# Patient Record
Sex: MALE | Race: WHITE | NOT HISPANIC OR LATINO | Employment: FULL TIME | URBAN - METROPOLITAN AREA
[De-identification: names, ages, dates, MRNs, and addresses within clinical notes are randomized per-mention and may not be internally consistent; named-entity substitution may affect disease eponyms.]

---

## 2018-09-29 ENCOUNTER — OFFICE VISIT (OUTPATIENT)
Dept: URGENT CARE | Facility: CLINIC | Age: 28
End: 2018-09-29
Payer: COMMERCIAL

## 2018-09-29 VITALS
HEART RATE: 78 BPM | TEMPERATURE: 98.6 F | OXYGEN SATURATION: 96 % | WEIGHT: 240 LBS | SYSTOLIC BLOOD PRESSURE: 171 MMHG | BODY MASS INDEX: 33.6 KG/M2 | DIASTOLIC BLOOD PRESSURE: 102 MMHG | RESPIRATION RATE: 18 BRPM | HEIGHT: 71 IN

## 2018-09-29 DIAGNOSIS — R03.0 ELEVATED BLOOD PRESSURE READING IN OFFICE WITHOUT DIAGNOSIS OF HYPERTENSION: ICD-10-CM

## 2018-09-29 DIAGNOSIS — R10.32 LEFT LOWER QUADRANT PAIN: Primary | ICD-10-CM

## 2018-09-29 PROCEDURE — 99203 OFFICE O/P NEW LOW 30 MIN: CPT | Performed by: EMERGENCY MEDICINE

## 2018-09-29 NOTE — PROGRESS NOTES
[address] [facility] [facility]        NAME: Kezia Rosado is a 29 y o  male  : 1990    MRN: [id number]  DATE: 2018  TIME: 9:23 AM    Assessment and Plan   Left lower quadrant pain [R10 32]  1  Left lower quadrant pain     2  Elevated blood pressure reading in office without diagnosis of hypertension           Patient Instructions     Patient Instructions     Inguinal Hernia   AMBULATORY CARE:   An inguinal hernia  happens when organs or abdominal tissue push through a weak spot in the abdominal wall  The abdominal wall is made of fat and muscle  It holds the intestines in place  The hernia may contain fluid, tissue from the abdomen, or part of an organ (such as an intestine)  Common signs and symptoms:  A hernia may happen over time or it may happen suddenly  Some movements can make symptoms worse  Examples include when you cough, sneeze, strain to have a bowel movement, lift, or stand for a long time  You may have any of the following:  · A soft lump or bulge in your groin, lower abdomen, or scrotum     · Pain or burning in your abdomen  Seek care immediately if:   · You have severe abdominal pain with nausea and vomiting  · Your abdomen is larger than usual      · Your hernia gets bigger or is purple or blue  · You see blood in your bowel movements  · You feel weak, dizzy, or faint  Contact your healthcare provider if:   · You have a fever  · You have questions or concerns about your condition or care  Treatment for an inguinal hernia  usually involves surgery  Surgery can be done to place the hernia back inside the abdominal wall  Before you have surgery, you may be given medicine or have a manual reduction  Manual reduction means your healthcare provider will use his hands to put firm, steady pressure on your hernia  He will continue until the hernia disappears inside the abdominal wall   You may  need the following:  · NSAIDs , such as ibuprofen, help decrease swelling, pain, and fever  NSAIDs can cause stomach bleeding or kidney problems in certain people  If you take blood thinner medicine, always ask your healthcare provider if NSAIDs are safe for you  Always read the medicine label and follow directions  · Take your medicine as directed  Contact your healthcare provider if you think your medicine is not helping or if you have side effects  Tell him or her if you are allergic to any medicine  Keep a list of the medicines, vitamins, and herbs you take  Include the amounts, and when and why you take them  Bring the list or the pill bottles to follow-up visits  Carry your medicine list with you in case of an emergency  Follow up with your healthcare provider as directed:  Write down your questions so you remember to ask them during your visits  Manage your symptoms and prevent another hernia:   · Do not lift anything heavy  Heavy lifting can make your hernia worse or cause another hernia  Ask your healthcare provider how much is safe for you to lift  · Drink liquids as directed  Liquids may prevent constipation and straining during a bowel movement  Ask how much liquid to drink each day and which liquids are best for you  · Eat foods high in fiber  Fiber may prevent constipation and straining during a bowel movement  Foods that contain fiber include fruits, vegetables, beans, lentils, and whole grains  · Maintain a healthy weight  If you are overweight, weight loss may prevent your hernia from getting worse  It may also prevent another hernia  Talk to your healthcare provider about exercise and how to lose weight safely if you are overweight  · Do not smoke  Nicotine and other chemicals in cigarettes and cigars can weaken the abdominal wall  This may increase your risk for another hernia  Ask your healthcare provider for information if you currently smoke and need help to quit  E-cigarettes or smokeless tobacco still contain nicotine   Talk to your healthcare provider before you use these products  © 2017 [address] [employer] Information is for End User's use only and may not be sold, redistributed or otherwise used for commercial purposes  All illustrations and images included in CareNotes® are the copyrighted property of [employer]  or [employer]  The above information is an  only  It is not intended as medical advice for individual conditions or treatments  Talk to your doctor, nurse or pharmacist before following any medical regimen to see if it is safe and effective for you  Prehypertension   WHAT YOU NEED TO KNOW:   What is prehypertension? Prehypertension is a blood pressure level that is slightly higher than normal  Blood pressure is the force of your blood pressing against the walls of your arteries  Normal blood pressure is less than 120/80  Prehypertension is a blood pressure level of 120/80 to 139/89 in adults and adolescents  Hypertension (high blood pressure) is a blood pressure level of 140/90 or above  Prehypertension increases your risk for chronic (long-term) high blood pressure  Prehypertension and high blood pressure increase your risk for heart and blood vessel disease  Over time, this increases your risk for a life-threatening heart attack, stroke, heart failure, or kidney disease  What increases my risk for prehypertension? · Obesity or lack of exercise     · Eating too much sodium (salt)    · Low intake of fruits, vegetables, and other foods high in potassium    · Use of tobacco, alcohol, or illegal drugs     · A medical condition, such as diabetes, high cholesterol, or sleep apnea    · Medicines, such as steroids or birth control pills     · A family history of hypertension or heart disease     · Age older than 54 years (men)    · Age older than 72 years (women)  How is prehypertension diagnosed? Your healthcare provider will ask if you have a family history of high blood pressure   He will also ask about the medicines you take and any health conditions you have  He will check your blood pressure using a blood pressure cuff  Your healthcare provider may take more than one blood pressure reading, and take the average of these readings  How can I manage prehypertension? · Eat less sodium (salt)  Do not add sodium to your food  Limit foods that are high in sodium, such as canned foods, potato chips, and cold cuts  Your healthcare provider may suggest that you follow the [address] [place] [address] Street  This eating plan is low in sodium, unhealthy fats, and total fat  It is high in potassium, calcium, and fiber  · Exercise to maintain or reach a healthy weight  Exercise at least 30 minutes per day, on most days of the week  This will help decrease your blood pressure  Ask your healthcare provider about the best exercise plan for you  · Decrease stress  This may help lower your BP  Learn ways to relax, such as deep breathing or listening to music  · Limit alcohol  Ask your healthcare provider if it is safe for you to drink alcohol  Women should limit alcohol to 1 drink a day  Men should limit alcohol to 2 drinks a day  A drink of alcohol is 12 ounces of beer, 5 ounces of wine, or 1½ ounces of liquor  · Do not smoke  Nicotine and other chemicals in cigarettes and cigars can increase your blood pressure and cause lung damage  Ask your healthcare provider for information if you currently smoke and need help to quit  E-cigarettes or smokeless tobacco still contain nicotine  Talk to your healthcare provider before you use these products  · Blood pressure medicine  may be needed if you have diabetes, heart disease, or kidney disease  · Manage your other health conditions  Take your diabetes and cholesterol medicine as directed  Go to regular follow-up visits with your healthcare provider to manage these conditions    Call 911 for any of the following:   · You have any of the following signs of a heart attack:      ¨ Squeezing, pressure, or pain in your chest that lasts longer than 5 minutes or returns    ¨ Discomfort or pain in your back, neck, jaw, stomach, or arm     ¨ Trouble breathing    ¨ Nausea or vomiting    ¨ Lightheadedness or a sudden cold sweat, especially with chest pain or trouble breathing    · You have any of the following signs of a stroke:      ¨ Numbness or drooping on one side of your face     ¨ Weakness in an arm or leg    ¨ Confusion or difficulty speaking    ¨ Dizziness, a severe headache, or vision loss  When should I seek immediate care? · You have a severe headache  · You have sudden vision changes  When should I contact my healthcare provider? · You have been taking blood pressure medicine and your blood pressure is still higher than your healthcare provider says it should be  · You have questions or concerns about your condition or care  CARE AGREEMENT:   You have the right to help plan your care  Learn about your health condition and how it may be treated  Discuss treatment options with your caregivers to decide what care you want to receive  You always have the right to refuse treatment  The above information is an  only  It is not intended as medical advice for individual conditions or treatments  Talk to your doctor, nurse or pharmacist before following any medical regimen to see if it is safe and effective for you  © 2017 [address]  Information is for End User's use only and may not be sold, redistributed or otherwise used for commercial purposes  All illustrations and images included in CareNotes® are the copyrighted property of [employer]  or [employer]  Follow up with PCP in 3-5 days  Proceed to  ER if symptoms worsen      Chief Complaint     Chief Complaint   Patient presents with    Abdominal Pain     lower left abdomianal painand pressure started wednesday          History of Present Illness       Patient complains of left groin pain for the past 3 days  Onset after heavy lifting 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 over the weekend  He denies any nausea vomiting diarrhea constipation  Denies fever chills sweats  Appetite and activity remain normal  He denies history of hypertension all though he was told on his last eye exam his blood pressure was elevated but he was not given a specific number  He does admit to a family history of hypertension  He denies headache, chest pain or shortness of breath  He denies any visual symptoms  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  He claims the past 5 weeks have been hell  Review of Systems   Review of Systems   Constitutional: Negative for appetite change, chills and fever  Respiratory: Negative for cough, shortness of breath and wheezing  Cardiovascular: Negative for chest pain and palpitations  Gastrointestinal: Positive for abdominal pain  Negative for abdominal distention, anal bleeding, blood in stool, constipation, diarrhea, nausea, rectal pain and vomiting  Genitourinary: Negative for flank pain  Musculoskeletal: Negative for back pain and neck stiffness  Skin: Negative for pallor  Neurological: Negative for syncope, light-headedness and headaches  Current Medications     No current outpatient prescriptions on file  Current Allergies     Allergies as of 09/29/2018 - Reviewed 09/29/2018   Allergen Reaction Noted    Penicillins Rash 09/29/2018            The following portions of the patient's history were reviewed and updated as appropriate: allergies, current medications, past family history, past medical history, past social history, past surgical history and problem list      History reviewed  No pertinent past medical history      Past Surgical History:   Procedure Laterality Date    WISDOM TOOTH EXTRACTION         Family History   Problem Relation Age of Onset    Diabetes Brother          Medications have been verified  Objective   BP (!) 171/102   Pulse 78   Temp 98 6 °F (37 °C)   Resp 18   Ht 5' 10 5" (1 791 m)   Wt 109 kg (240 lb)   SpO2 96%   BMI 33 95 kg/m²        Physical Exam     Physical Exam   Constitutional: He is oriented to person, place, and time  He appears well-developed and well-nourished  No distress  HENT:   Head: Normocephalic and atraumatic  Cardiovascular: Normal rate, regular rhythm and normal heart sounds  Pulmonary/Chest: Effort normal and breath sounds normal    Abdominal: Bowel sounds are normal  He exhibits no distension and no mass  There is tenderness  There is no rebound and no guarding  Mild tenderness long left inguinal ligament and that internal inguinal ring left however no hernia appreciated  Musculoskeletal: Normal range of motion  Neurological: He is alert and oriented to person, place, and time  Skin: Skin is warm and dry  He is not diaphoretic  No pallor  Psychiatric: He has a normal mood and affect  Nursing note and vitals reviewed

## 2018-09-29 NOTE — PATIENT INSTRUCTIONS
Inguinal Hernia   AMBULATORY CARE:   An inguinal hernia  happens when organs or abdominal tissue push through a weak spot in the abdominal wall  The abdominal wall is made of fat and muscle  It holds the intestines in place  The hernia may contain fluid, tissue from the abdomen, or part of an organ (such as an intestine)  Common signs and symptoms:  A hernia may happen over time or it may happen suddenly  Some movements can make symptoms worse  Examples include when you cough, sneeze, strain to have a bowel movement, lift, or stand for a long time  You may have any of the following:  · A soft lump or bulge in your groin, lower abdomen, or scrotum     · Pain or burning in your abdomen  Seek care immediately if:   · You have severe abdominal pain with nausea and vomiting  · Your abdomen is larger than usual      · Your hernia gets bigger or is purple or blue  · You see blood in your bowel movements  · You feel weak, dizzy, or faint  Contact your healthcare provider if:   · You have a fever  · You have questions or concerns about your condition or care  Treatment for an inguinal hernia  usually involves surgery  Surgery can be done to place the hernia back inside the abdominal wall  Before you have surgery, you may be given medicine or have a manual reduction  Manual reduction means your healthcare provider will use his hands to put firm, steady pressure on your hernia  He will continue until the hernia disappears inside the abdominal wall  You may  need the following:  · NSAIDs , such as ibuprofen, help decrease swelling, pain, and fever  NSAIDs can cause stomach bleeding or kidney problems in certain people  If you take blood thinner medicine, always ask your healthcare provider if NSAIDs are safe for you  Always read the medicine label and follow directions  · Take your medicine as directed    Contact your healthcare provider if you think your medicine is not helping or if you have side effects  Tell him or her if you are allergic to any medicine  Keep a list of the medicines, vitamins, and herbs you take  Include the amounts, and when and why you take them  Bring the list or the pill bottles to follow-up visits  Carry your medicine list with you in case of an emergency  Follow up with your healthcare provider as directed:  Write down your questions so you remember to ask them during your visits  Manage your symptoms and prevent another hernia:   · Do not lift anything heavy  Heavy lifting can make your hernia worse or cause another hernia  Ask your healthcare provider how much is safe for you to lift  · Drink liquids as directed  Liquids may prevent constipation and straining during a bowel movement  Ask how much liquid to drink each day and which liquids are best for you  · Eat foods high in fiber  Fiber may prevent constipation and straining during a bowel movement  Foods that contain fiber include fruits, vegetables, beans, lentils, and whole grains  · Maintain a healthy weight  If you are overweight, weight loss may prevent your hernia from getting worse  It may also prevent another hernia  Talk to your healthcare provider about exercise and how to lose weight safely if you are overweight  · Do not smoke  Nicotine and other chemicals in cigarettes and cigars can weaken the abdominal wall  This may increase your risk for another hernia  Ask your healthcare provider for information if you currently smoke and need help to quit  E-cigarettes or smokeless tobacco still contain nicotine  Talk to your healthcare provider before you use these products  © 2017 [address] [employer] Information is for End User's use only and may not be sold, redistributed or otherwise used for commercial purposes  All illustrations and images included in CareNotes® are the copyrighted property of [employer]  or [employer]  The above information is an  only  It is not intended as medical advice for individual conditions or treatments  Talk to your doctor, nurse or pharmacist before following any medical regimen to see if it is safe and effective for you  Prehypertension   WHAT YOU NEED TO KNOW:   What is prehypertension? Prehypertension is a blood pressure level that is slightly higher than normal  Blood pressure is the force of your blood pressing against the walls of your arteries  Normal blood pressure is less than 120/80  Prehypertension is a blood pressure level of 120/80 to 139/89 in adults and adolescents  Hypertension (high blood pressure) is a blood pressure level of 140/90 or above  Prehypertension increases your risk for chronic (long-term) high blood pressure  Prehypertension and high blood pressure increase your risk for heart and blood vessel disease  Over time, this increases your risk for a life-threatening heart attack, stroke, heart failure, or kidney disease  What increases my risk for prehypertension? · Obesity or lack of exercise     · Eating too much sodium (salt)    · Low intake of fruits, vegetables, and other foods high in potassium    · Use of tobacco, alcohol, or illegal drugs     · A medical condition, such as diabetes, high cholesterol, or sleep apnea    · Medicines, such as steroids or birth control pills     · A family history of hypertension or heart disease     · Age older than 54 years (men)    · Age older than 72 years (women)  How is prehypertension diagnosed? Your healthcare provider will ask if you have a family history of high blood pressure  He will also ask about the medicines you take and any health conditions you have  He will check your blood pressure using a blood pressure cuff  Your healthcare provider may take more than one blood pressure reading, and take the average of these readings  How can I manage prehypertension? · Eat less sodium (salt)  Do not add sodium to your food   Limit foods that are high in sodium, such as canned foods, potato chips, and cold cuts  Your healthcare provider may suggest that you follow the [address] [place] [address] Street  This eating plan is low in sodium, unhealthy fats, and total fat  It is high in potassium, calcium, and fiber  · Exercise to maintain or reach a healthy weight  Exercise at least 30 minutes per day, on most days of the week  This will help decrease your blood pressure  Ask your healthcare provider about the best exercise plan for you  · Decrease stress  This may help lower your BP  Learn ways to relax, such as deep breathing or listening to music  · Limit alcohol  Ask your healthcare provider if it is safe for you to drink alcohol  Women should limit alcohol to 1 drink a day  Men should limit alcohol to 2 drinks a day  A drink of alcohol is 12 ounces of beer, 5 ounces of wine, or 1½ ounces of liquor  · Do not smoke  Nicotine and other chemicals in cigarettes and cigars can increase your blood pressure and cause lung damage  Ask your healthcare provider for information if you currently smoke and need help to quit  E-cigarettes or smokeless tobacco still contain nicotine  Talk to your healthcare provider before you use these products  · Blood pressure medicine  may be needed if you have diabetes, heart disease, or kidney disease  · Manage your other health conditions  Take your diabetes and cholesterol medicine as directed  Go to regular follow-up visits with your healthcare provider to manage these conditions    Call 911 for any of the following:   · You have any of the following signs of a heart attack:      ¨ Squeezing, pressure, or pain in your chest that lasts longer than 5 minutes or returns    ¨ Discomfort or pain in your back, neck, jaw, stomach, or arm     ¨ Trouble breathing    ¨ Nausea or vomiting    ¨ Lightheadedness or a sudden cold sweat, especially with chest pain or trouble breathing    · You have any of the following signs of a stroke:      ¨ Numbness or drooping on one side of your face     ¨ Weakness in an arm or leg    ¨ Confusion or difficulty speaking    ¨ Dizziness, a severe headache, or vision loss  When should I seek immediate care? · You have a severe headache  · You have sudden vision changes  When should I contact my healthcare provider? · You have been taking blood pressure medicine and your blood pressure is still higher than your healthcare provider says it should be  · You have questions or concerns about your condition or care  CARE AGREEMENT:   You have the right to help plan your care  Learn about your health condition and how it may be treated  Discuss treatment options with your caregivers to decide what care you want to receive  You always have the right to refuse treatment  The above information is an  only  It is not intended as medical advice for individual conditions or treatments  Talk to your doctor, nurse or pharmacist before following any medical regimen to see if it is safe and effective for you  © 2017 [facility] Information is for End User's use only and may not be sold, redistributed or otherwise used for commercial purposes  All illustrations and images included in CareNotes® are the copyrighted property of [employer]  or [employer]

## 2020-02-28 ENCOUNTER — OFFICE VISIT (OUTPATIENT)
Dept: FAMILY MEDICINE CLINIC | Facility: CLINIC | Age: 30
End: 2020-02-28
Payer: COMMERCIAL

## 2020-02-28 VITALS
OXYGEN SATURATION: 99 % | DIASTOLIC BLOOD PRESSURE: 88 MMHG | HEART RATE: 91 BPM | BODY MASS INDEX: 35.83 KG/M2 | HEIGHT: 71 IN | SYSTOLIC BLOOD PRESSURE: 138 MMHG | WEIGHT: 255.95 LBS

## 2020-02-28 DIAGNOSIS — Z23 NEEDS FLU SHOT: ICD-10-CM

## 2020-02-28 DIAGNOSIS — Z23 ENCOUNTER FOR IMMUNIZATION: Primary | ICD-10-CM

## 2020-02-28 DIAGNOSIS — Z23 NEED FOR IMMUNIZATION AGAINST INFLUENZA: ICD-10-CM

## 2020-02-28 PROBLEM — Z78.9 NO KNOWN HEALTH PROBLEMS: Status: ACTIVE | Noted: 2020-02-28

## 2020-02-28 PROCEDURE — 90471 IMMUNIZATION ADMIN: CPT | Performed by: NURSE PRACTITIONER

## 2020-02-28 PROCEDURE — 90472 IMMUNIZATION ADMIN EACH ADD: CPT | Performed by: NURSE PRACTITIONER

## 2020-02-28 PROCEDURE — 90715 TDAP VACCINE 7 YRS/> IM: CPT | Performed by: NURSE PRACTITIONER

## 2020-02-28 PROCEDURE — 3008F BODY MASS INDEX DOCD: CPT | Performed by: NURSE PRACTITIONER

## 2020-02-28 PROCEDURE — 99203 OFFICE O/P NEW LOW 30 MIN: CPT | Performed by: NURSE PRACTITIONER

## 2020-02-28 PROCEDURE — 90686 IIV4 VACC NO PRSV 0.5 ML IM: CPT | Performed by: NURSE PRACTITIONER

## 2020-02-28 PROCEDURE — 1036F TOBACCO NON-USER: CPT | Performed by: NURSE PRACTITIONER

## 2020-02-28 NOTE — PROGRESS NOTES
Assessment/Plan:       Diagnoses and all orders for this visit:    Need for immunization against influenza  -     TDAP VACCINE GREATER THAN OR EQUAL TO 6YO IM    Encounter for immunization  -     TDAP VACCINE GREATER THAN OR EQUAL TO 6YO IM    Needs flu shot  -     influenza vaccine, [id number], quadrivalent, 0 5 mL, preservative-free, for adult and pediatric patients 6 mos+ (AFLURIA, FLUARIX, FLULAVAL, FLUZONE)      Will provide a Tdap booster and influenza vaccine  Return in one month for a physical      Subjective:      Patient ID: Darío Haile is a 34 y o  male  Here today as a new patient to establish care in need of immunizations prior to the birth of his first child  He is in need of a Tdap booster and influenza vaccine  No history of any major health issues  He is not taking any prescription medication  The following portions of the patient's history were reviewed and updated as appropriate: allergies, current medications, past family history, past medical history, past social history, past surgical history and problem list     Review of Systems   Constitutional: Negative  Respiratory: Negative  Cardiovascular: Negative  Neurological: Negative  Objective:      /88 (BP Location: Left arm, Patient Position: Sitting, Cuff Size: Large)   Pulse 91   Ht 5' 11" (1 803 m)   Wt 116 kg (255 lb 15 3 oz)   SpO2 99%   BMI 35 70 kg/m²          Physical Exam   Constitutional: He is oriented to person, place, and time  HENT:   Head: Normocephalic and atraumatic  Cardiovascular: Normal rate, regular rhythm and normal heart sounds  Exam reveals no gallop and no friction rub  No murmur heard  Pulmonary/Chest: Effort normal and breath sounds normal  No stridor  No respiratory distress  He has no wheezes  He has no rales  Musculoskeletal: Normal range of motion  Neurological: He is alert and oriented to person, place, and time  Skin: Skin is warm and dry  Psychiatric: He has a normal mood and affect   His behavior is normal  Judgment and thought content normal

## 2020-02-28 NOTE — PATIENT INSTRUCTIONS
You may receive a survey in the mail, or by e-mail, please fill it out, and let us know how we did! Please remember to sign up for your [employer] jaymie to check you lab results, send us messages, and schedule appointments  If you have questions about the [employer] option, please call us! Thank you again for choosing [facility]!

## 2021-02-26 DIAGNOSIS — Z23 ENCOUNTER FOR IMMUNIZATION: ICD-10-CM

## 2021-08-30 ENCOUNTER — OFFICE VISIT (OUTPATIENT)
Dept: FAMILY MEDICINE CLINIC | Facility: CLINIC | Age: 31
End: 2021-08-30
Payer: COMMERCIAL

## 2021-08-30 VITALS
BODY MASS INDEX: 38.39 KG/M2 | HEIGHT: 71 IN | SYSTOLIC BLOOD PRESSURE: 128 MMHG | DIASTOLIC BLOOD PRESSURE: 84 MMHG | WEIGHT: 274.2 LBS | HEART RATE: 84 BPM | OXYGEN SATURATION: 98 %

## 2021-08-30 DIAGNOSIS — Z13.1 ENCOUNTER FOR SCREENING FOR DIABETES MELLITUS: ICD-10-CM

## 2021-08-30 DIAGNOSIS — Z13.220 ENCOUNTER FOR SCREENING FOR LIPID DISORDER: Primary | ICD-10-CM

## 2021-08-30 DIAGNOSIS — Z11.59 NEED FOR HEPATITIS C SCREENING TEST: ICD-10-CM

## 2021-08-30 DIAGNOSIS — R63.5 WEIGHT GAIN: ICD-10-CM

## 2021-08-30 DIAGNOSIS — Z11.4 SCREENING FOR HIV (HUMAN IMMUNODEFICIENCY VIRUS): ICD-10-CM

## 2021-08-30 PROBLEM — E66.812 CLASS 2 OBESITY WITH BODY MASS INDEX (BMI) OF 38.0 TO 38.9 IN ADULT: Status: ACTIVE | Noted: 2021-08-30

## 2021-08-30 PROBLEM — E66.9 CLASS 2 OBESITY WITH BODY MASS INDEX (BMI) OF 38.0 TO 38.9 IN ADULT: Status: ACTIVE | Noted: 2021-08-30

## 2021-08-30 PROBLEM — Z78.9 NO KNOWN HEALTH PROBLEMS: Status: RESOLVED | Noted: 2020-02-28 | Resolved: 2021-08-30

## 2021-08-30 PROCEDURE — 99395 PREV VISIT EST AGE 18-39: CPT | Performed by: INTERNAL MEDICINE

## 2021-08-30 PROCEDURE — 3008F BODY MASS INDEX DOCD: CPT | Performed by: INTERNAL MEDICINE

## 2021-08-30 PROCEDURE — 1036F TOBACCO NON-USER: CPT | Performed by: INTERNAL MEDICINE

## 2021-08-30 PROCEDURE — 3725F SCREEN DEPRESSION PERFORMED: CPT | Performed by: INTERNAL MEDICINE

## 2021-08-30 NOTE — ASSESSMENT & PLAN NOTE
We reviewed his current challenges with his diet  I encouraged him to stop purchasing ice cream and other high carbohydrate snacks and to switch to eating more fruits and vegetables

## 2021-08-30 NOTE — PROGRESS NOTES
Assessment/Plan:    Problem List Items Addressed This Visit     None      Visit Diagnoses     Encounter for screening for lipid disorder    -  Primary    Relevant Orders    Lipid panel    Encounter for screening for diabetes mellitus        Relevant Orders    Basic metabolic panel    Weight gain        Relevant Orders    TSH, 3rd generation with Free T4 reflex    Need for hepatitis C screening test        Relevant Orders    Hepatitis C Antibody ([facility])    Screening for HIV (human immunodeficiency virus)        Relevant Orders    HIV 1/2 Antigen/Antibody (4th Generation) w Reflex [facility]        BMI Counseling: Body mass index is 38 24 kg/m²  The BMI is above normal  Nutrition recommendations include encouraging healthy choices of fruits and vegetables and moderation in carbohydrate intake  He probably has a component of irritable bowel syndrome  Lack of weight loss or fever would speak against a more serious etiology  We can address this further at follow-up  Blood pressure is normal here in the office pool continue to monitor  Chief Complaint     Annual Exam          Patient ID: Bonita Gaspar is a 32 y o  male who returns for a physical  He has an elevated blood pressure at an eye appointment recently  Home BPs have been normal  He reports that within 15-20 minutes has has to have a bowel movement which is a normal formed stool  He gets a RLQ a couple of times a month  He usually has 2-3 formed bowel movements a day  If he exercises more regularly, he notes that the frequency of his bowel movements decrease  His weight is up 34 lbs since September 2018  He feels that a significant part of his weight gain is related to not having as good as an exercise regimen as he had had when he lived out in [place]  He had been going to [employer] to get breakfast but doesn't really eat much fast food  He does snack on chips and salsa, lots of carbs   He has been trying to not eat after dinner after the last week        Objective:    /84 (BP Location: Right arm, Patient Position: Sitting, Cuff Size: Large)   Pulse 84   Ht 5' 11" (1 803 m)   Wt 124 kg (274 lb 3 2 oz)   SpO2 98%   BMI 38 24 kg/m²     Wt Readings from Last 3 Encounters:   08/30/21 124 kg (274 lb 3 2 oz)   02/28/20 116 kg (255 lb 15 3 oz)   09/29/18 109 kg (240 lb)     Physical Exam  Vitals reviewed  Constitutional:       General: He is not in acute distress  Appearance: Normal appearance  He is well-developed  He is obese  He is not ill-appearing  HENT:      Head: Normocephalic and atraumatic  Right Ear: Tympanic membrane and external ear normal       Left Ear: Tympanic membrane and external ear normal       Nose: Nose normal       Mouth/Throat:      Mouth: Mucous membranes are moist       Pharynx: Oropharynx is clear  Eyes:      General: No scleral icterus  Extraocular Movements: Extraocular movements intact  Pupils: Pupils are equal, round, and reactive to light  Neck:      Thyroid: No thyromegaly  Vascular: No JVD  Cardiovascular:      Rate and Rhythm: Normal rate and regular rhythm  Heart sounds: Normal heart sounds  No murmur heard  No friction rub  No gallop  Pulmonary:      Breath sounds: Normal breath sounds  Abdominal:      General: Bowel sounds are normal  There is no distension  Palpations: Abdomen is soft  There is no mass  Musculoskeletal:         General: No swelling  Lymphadenopathy:      Cervical: No cervical adenopathy  Neurological:      Mental Status: He is alert     Psychiatric:         Mood and Affect: Mood normal

## 2021-09-03 ENCOUNTER — APPOINTMENT (OUTPATIENT)
Dept: LAB | Facility: CLINIC | Age: 31
End: 2021-09-03

## 2021-09-03 DIAGNOSIS — Z11.59 NEED FOR HEPATITIS C SCREENING TEST: ICD-10-CM

## 2021-09-03 DIAGNOSIS — Z13.1 ENCOUNTER FOR SCREENING FOR DIABETES MELLITUS: ICD-10-CM

## 2021-09-03 DIAGNOSIS — Z13.220 ENCOUNTER FOR SCREENING FOR LIPID DISORDER: ICD-10-CM

## 2021-09-03 DIAGNOSIS — Z11.4 SCREENING FOR HIV (HUMAN IMMUNODEFICIENCY VIRUS): ICD-10-CM

## 2021-09-03 DIAGNOSIS — R63.5 WEIGHT GAIN: ICD-10-CM

## 2021-09-03 LAB
ANION GAP SERPL CALCULATED.3IONS-SCNC: 2 MMOL/L (ref 4–13)
BUN SERPL-MCNC: 15 MG/DL (ref 5–25)
CALCIUM SERPL-MCNC: 9.1 MG/DL (ref 8.3–10.1)
CHLORIDE SERPL-SCNC: 107 MMOL/L (ref 100–108)
CHOLEST SERPL-MCNC: 214 MG/DL (ref 50–200)
CO2 SERPL-SCNC: 29 MMOL/L (ref 21–32)
CREAT SERPL-MCNC: 1.19 MG/DL (ref 0.6–1.3)
GFR SERPL CREATININE-BSD FRML MDRD: 81 ML/MIN/1.73SQ M
GLUCOSE P FAST SERPL-MCNC: 97 MG/DL (ref 65–99)
HCV AB SER QL: NORMAL
HDLC SERPL-MCNC: 36 MG/DL
LDLC SERPL CALC-MCNC: 143 MG/DL (ref 0–100)
NONHDLC SERPL-MCNC: 178 MG/DL
POTASSIUM SERPL-SCNC: 4.5 MMOL/L (ref 3.5–5.3)
SODIUM SERPL-SCNC: 138 MMOL/L (ref 136–145)
TRIGL SERPL-MCNC: 174 MG/DL
TSH SERPL DL<=0.05 MIU/L-ACNC: 1.15 UIU/ML (ref 0.36–3.74)

## 2021-09-03 PROCEDURE — 36415 COLL VENOUS BLD VENIPUNCTURE: CPT

## 2021-09-03 PROCEDURE — 80048 BASIC METABOLIC PNL TOTAL CA: CPT

## 2021-09-03 PROCEDURE — 84443 ASSAY THYROID STIM HORMONE: CPT

## 2021-09-03 PROCEDURE — 87389 HIV-1 AG W/HIV-1&-2 AB AG IA: CPT

## 2021-09-03 PROCEDURE — 86803 HEPATITIS C AB TEST: CPT

## 2021-09-03 PROCEDURE — 80061 LIPID PANEL: CPT

## 2021-09-05 LAB — HIV 1+2 AB+HIV1 P24 AG SERPL QL IA: NORMAL

## 2022-04-10 ENCOUNTER — OFFICE VISIT (OUTPATIENT)
Dept: URGENT CARE | Facility: CLINIC | Age: 32
End: 2022-04-10
Payer: COMMERCIAL

## 2022-04-10 VITALS
RESPIRATION RATE: 18 BRPM | HEART RATE: 90 BPM | OXYGEN SATURATION: 98 % | TEMPERATURE: 98.2 F | SYSTOLIC BLOOD PRESSURE: 159 MMHG | HEIGHT: 71 IN | DIASTOLIC BLOOD PRESSURE: 94 MMHG | WEIGHT: 270 LBS | BODY MASS INDEX: 37.8 KG/M2

## 2022-04-10 DIAGNOSIS — H65.01 NON-RECURRENT ACUTE SEROUS OTITIS MEDIA OF RIGHT EAR: Primary | ICD-10-CM

## 2022-04-10 PROCEDURE — 99213 OFFICE O/P EST LOW 20 MIN: CPT | Performed by: EMERGENCY MEDICINE

## 2022-04-10 PROCEDURE — 87636 SARSCOV2 & INF A&B AMP PRB: CPT | Performed by: EMERGENCY MEDICINE

## 2022-04-10 RX ORDER — PREDNISONE 10 MG/1
TABLET ORAL
Qty: 27 TABLET | Refills: 0 | Status: SHIPPED | OUTPATIENT
Start: 2022-04-10

## 2022-04-10 RX ORDER — DIPHENOXYLATE HYDROCHLORIDE AND ATROPINE SULFATE 2.5; .025 MG/1; MG/1
1 TABLET ORAL DAILY
COMMUNITY

## 2022-04-10 RX ORDER — DOXYCYCLINE HYCLATE 100 MG/1
CAPSULE ORAL
COMMUNITY
Start: 2022-04-08

## 2022-04-10 NOTE — PROGRESS NOTES
[facility]        NAME: Rosa Alejandro is a 32 y o  male  : 1990    MRN: [id number]  DATE: April 10, 2022  TIME: 10:51 AM    Assessment and Plan   Non-recurrent acute serous otitis media of right ear [H65 01]  1  Non-recurrent acute serous otitis media of right ear  Cov/Flu-Collected at [facility] or [facility]    predniSONE 10 mg tablet         Patient Instructions     Patient Instructions     Take an expectorant - guaifenesin should be the only ingredient - during the day, and the cough suppressant (ex  Robitussin DM or Tessalon) if needed at night only  Take Zinc 50 mg every 12 hours for the next week   You should also take Quercetin 500 mg twice daily with it  You should also take vitamin D3 5000 i u s per day for the next 1 week, and vitamin-C 1 g daily  If you are diabetic you should adhere strictly to your diabetic diet and monitor blood sugar closely while on prednisone and you should discontinue the prednisone if blood sugar becomes significantly elevated   Avoid nonsteroidal anti-inflammatories like Advil or Aleve while on prednisone  Fluid In The Ear (Serous Otitis Media)   WHAT YOU NEED TO KNOW:   BUDDY is fluid trapped in the middle of your ear behind your eardrum  This condition usually develops without signs or symptoms of an ear infection  Serous otitis media may be caused by an upper respiratory infection or allergies  It is most common in the fall and early spring  DISCHARGE INSTRUCTIONS:   Call your doctor if:   · You develop severe ear pain  · The outside of your ear is red or swollen  · You have fluid coming from your ear  · You have questions or concerns about your condition or care  How to stay healthy:   · Wash your hands often throughout the day  Use soap and water  Rub your soapy hands together, lacing your fingers, for at least 20 seconds  Rinse with warm, running water  Dry your hands with a clean towel or paper towel   Use hand  that contains alcohol if soap and water are not available  Teach children how to wash their hands and use hand   · Avoid people who are sick  Some germs are easily and quickly spread through contact  Follow up with your doctor as directed:  Write down your questions so you remember to ask them during your visits  © [employer] 2022 Information is for End User's use only and may not be sold, redistributed or otherwise used for commercial purposes  All illustrations and images included in CareNotes® are the copyrighted property of A D A M , Inc  or [address] [place], [place] [address]elisabet   The above information is an  only  It is not intended as medical advice for individual conditions or treatments  Talk to your doctor, nurse or pharmacist before following any medical regimen to see if it is safe and effective for you  Follow up with PCP in 3-5 days  Proceed to  ER if symptoms worsen  Chief Complaint     Chief Complaint   Patient presents with    COVID-19     Right Ear Pain, Hearing loss per patient, Dizziness, Nausea, Congestion, and Cough  Started about two weeks ago  Seen on 4/8 PCP For Right Ear Pain started Doxycycline  History of Present Illness       Patient complains of right ear fullness associated with muffled hearing for the past 2 days  He also admits to some positional vertigo with nausea today  He admits to cough and congestion for the past 2 weeks  He had a telemedicine visit his PCP 2 days ago and was prescribed doxycycline but claims no symptomatic improvement  Review of Systems   Review of Systems   Constitutional: Negative for chills and fever  HENT: Positive for congestion, ear pain, hearing loss and sinus pressure  Negative for ear discharge and rhinorrhea  Respiratory: Positive for cough  Negative for chest tightness, shortness of breath and wheezing  Gastrointestinal: Negative for diarrhea and vomiting     Musculoskeletal: Negative for neck stiffness  Skin: Negative for pallor  Neurological: Negative for headaches  Current Medications       Current Outpatient Medications:     doxycycline hyclate (VIBRAMYCIN) 100 mg capsule, PLEASE SEE ATTACHED FOR DETAILED DIRECTIONS, Disp: , Rfl:     multivitamin (THERAGRAN) TABS, Take 1 tablet by mouth daily, Disp: , Rfl:     predniSONE 10 mg tablet, Take once daily all days pills on this schedule 6- 6- 5- 4- 3- 2- 1, Disp: 27 tablet, Rfl: 0    Current Allergies     Allergies as of 04/10/2022 - Reviewed 04/10/2022   Allergen Reaction Noted    Penicillins Rash 09/29/2018            The following portions of the patient's history were reviewed and updated as appropriate: allergies, current medications, past family history, past medical history, past social history, past surgical history and problem list      History reviewed  No pertinent past medical history  Past Surgical History:   Procedure Laterality Date    WISDOM TOOTH EXTRACTION         Family History   Problem Relation Age of Onset    Diabetes type I Brother          Medications have been verified  Objective   /94   Pulse 90   Temp 98 2 °F (36 8 °C)   Resp 18   Ht 5' 11" (1 803 m)   Wt 122 kg (270 lb)   SpO2 98%   BMI 37 66 kg/m²        Physical Exam     Physical Exam  Vitals and nursing note reviewed  Constitutional:       General: He is not in acute distress  Appearance: He is well-developed  HENT:      Right Ear: Ear canal normal  No tenderness  No middle ear effusion  Tympanic membrane is not erythematous  Left Ear: Ear canal normal  No decreased hearing noted  No tenderness  A middle ear effusion is present  Tympanic membrane is not erythematous  Ears:      Comments: Clear effusion behind right TM, no erythema of TMs  Nose: Congestion present  Mouth/Throat:      Pharynx: No oropharyngeal exudate  Musculoskeletal:      Cervical back: Neck supple     Skin:     General: Skin is warm and dry  Findings: No rash  Neurological:      Mental Status: He is alert  Psychiatric:         Mood and Affect: Mood normal          Behavior: Behavior normal          Thought Content:  Thought content normal          Judgment: Judgment normal

## 2022-04-10 NOTE — PATIENT INSTRUCTIONS
Take an expectorant - guaifenesin should be the only ingredient - during the day, and the cough suppressant (ex  Robitussin DM or Tessalon) if needed at night only  Take Zinc 50 mg every 12 hours for the next week   You should also take Quercetin 500 mg twice daily with it  You should also take vitamin D3 5000 i u s per day for the next 1 week, and vitamin-C 1 g daily  If you are diabetic you should adhere strictly to your diabetic diet and monitor blood sugar closely while on prednisone and you should discontinue the prednisone if blood sugar becomes significantly elevated   Avoid nonsteroidal anti-inflammatories like Advil or Aleve while on prednisone  Fluid In The Ear (Serous Otitis Media)   WHAT YOU NEED TO KNOW:   BUDDY is fluid trapped in the middle of your ear behind your eardrum  This condition usually develops without signs or symptoms of an ear infection  Serous otitis media may be caused by an upper respiratory infection or allergies  It is most common in the fall and early spring  DISCHARGE INSTRUCTIONS:   Call your doctor if:   · You develop severe ear pain  · The outside of your ear is red or swollen  · You have fluid coming from your ear  · You have questions or concerns about your condition or care  How to stay healthy:   · Wash your hands often throughout the day  Use soap and water  Rub your soapy hands together, lacing your fingers, for at least 20 seconds  Rinse with warm, running water  Dry your hands with a clean towel or paper towel  Use hand  that contains alcohol if soap and water are not available  Teach children how to wash their hands and use hand   · Avoid people who are sick  Some germs are easily and quickly spread through contact  Follow up with your doctor as directed:  Write down your questions so you remember to ask them during your visits     © Copyright [employer] 2022 Information is for End User's use only and may not be sold, redistributed or otherwise used for commercial purposes  All illustrations and images included in CareNotes® are the copyrighted property of A D A M , Inc  or Lian Madrigal  The above information is an  only  It is not intended as medical advice for individual conditions or treatments  Talk to your doctor, nurse or pharmacist before following any medical regimen to see if it is safe and effective for you

## 2022-04-11 LAB
FLUAV RNA RESP QL NAA+PROBE: NEGATIVE
FLUBV RNA RESP QL NAA+PROBE: NEGATIVE
SARS-COV-2 RNA RESP QL NAA+PROBE: NEGATIVE

## 2023-03-29 ENCOUNTER — TELEPHONE (OUTPATIENT)
Dept: FAMILY MEDICINE CLINIC | Facility: CLINIC | Age: 33
End: 2023-03-29

## 2023-03-29 NOTE — TELEPHONE ENCOUNTER
Letter mailed to patient requesting they call our office to schedule a physical or update their PCP information if they no longer come to this office

## 2023-06-14 NOTE — PROGRESS NOTES
ADULT ANNUAL [address] [facility]    NAME: Erik Perez  AGE: 35 y o  SEX: male  : 1990     DATE: 2023     Assessment and Plan:     Problem List Items Addressed This Visit        Other    Class 2 obesity due to excess calories without serious comorbidity with body mass index (BMI) of 39 0 to 39 9 in adult    Anxiety     GERI score of 7  He has noted improvements in his anxiety from getting adjusted to his new job  His symptom is mainly that he feels his mind races and he gets lost in his thoughts sometimes  He does not have any concerns currently        Other Visit Diagnoses     Annual physical exam    -  Primary    Relevant Orders    CBC and differential    Comprehensive metabolic panel    Lipid panel    Skin lump of leg, right              Immunizations and preventive care screenings were discussed with patient today  Appropriate education was printed on patient's after visit summary  Counseling:  Alcohol/drug use: discussed moderation in alcohol intake, the recommendations for healthy alcohol use, and avoidance of illicit drug use  Dental Health: discussed importance of regular tooth brushing, flossing, and dental visits  · Exercise: the importance of regular exercise/physical activity was discussed  Recommend exercise 3-5 times per week for at least 30 minutes  BMI Counseling: Body mass index is 39 47 kg/m²  The BMI is above normal  Nutrition recommendations include encouraging healthy choices of fruits and vegetables, limiting drinks that contain sugar and increasing intake of lean protein  Exercise recommendations include moderate physical activity 150 minutes/week and exercising 3-5 times per week  Rationale for BMI follow-up plan is due to patient being overweight or obese  Depression Screening and Follow-up Plan: Patient was screened for depression during today's encounter   They screened negative with a PHQ-2 score of 0  Patient was seen today for an annual physical exam  Age appropriate screening tests were done as above  Annual labs were ordered to be done through [facility]  Patient will be contacted regarding results and follow up will be based on findings  Patient was counseled on the importance of proper diet and exercise  I recommend diets rich in lean meats and leafy green vegetables  Try to have 150 minutes of exercise weekly at moderate intensity  See problem based charting for any chronic problems or new findings and current workup/management  A1c discussed in office  I will refer patient to care management to work on diet and exercise for weight loss  Patient brought up a bony prominence on the right side under his knee  He states that this has been slightly more prominent than it used to  Feeling of both knees are roughly equal on exam   There is a possibility for Osgood-Schlatter  He is not currently very active  We discussed x-ray versus watchful waiting  At this point we will watch and if any active changes in size I will order an x-ray  40 minutes were spent on chart review, examination, and plan of care  This note was dictated using software  Return in about 3 months (around 9/22/2023) for Recheck, bp and weight check  Chief Complaint:     Chief Complaint   Patient presents with   • Establish Care   • Annual Exam      History of Present Illness:     Adult Annual Physical   Patient here for a comprehensive physical exam  The patient reports establish  Diet and Physical Activity  · Diet/Nutrition: poor diet, he eats for comfort due to stress  Low vegetables  · Exercise: sporadic, spinbike at home        Depression Screening  PHQ-2/9 Depression Screening    Little interest or pleasure in doing things: 0 - not at all  Feeling down, depressed, or hopeless: 0 - not at all  PHQ-2 Score: 0  PHQ-2 Interpretation: Negative depression screen General Health  · Sleep: some snoring  · Hearing: normal - bilateral   · Vision: wears glasses  · Dental: regular dental visits   Health  · History of STDs?: no      Review of Systems:     Review of Systems   Respiratory: Negative for shortness of breath  Cardiovascular: Negative for chest pain  Gastrointestinal: Negative for abdominal pain, constipation, diarrhea, nausea and vomiting  Genitourinary: Negative for dysuria  Skin: Negative for rash  Past Medical History:     Past Medical History:   Diagnosis Date   • Allergic     Seasonal   • Anxiety     Rumination, work related stress   • Hypertension     Last appointment blood pressure was high   • Obesity     Looking to lose weight      Past Surgical History:     Past Surgical History:   Procedure Laterality Date   • WISDOM TOOTH EXTRACTION        Social History:     Social History     Socioeconomic History   • Marital status: /Civil Union     Spouse name: None   • Number of children: None   • Years of education: None   • Highest education level: None   Occupational History   • None   Tobacco Use   • Smoking status: Never   • Smokeless tobacco: Never   Vaping Use   • Vaping Use: Never used   Substance and Sexual Activity   • Alcohol use:  Yes   • Drug use: Never   • Sexual activity: Yes     Partners: Female     Birth control/protection: Coitus interruptus, Condom Male     Comment: Birth Control   Other Topics Concern   • None   Social History Narrative   • None     Social Determinants of Health     Financial Resource Strain: Not on file   Food Insecurity: Not on file   Transportation Needs: Not on file   Physical Activity: Not on file   Stress: Not on file   Social Connections: Not on file   Intimate Partner Violence: Not on file   Housing Stability: Not on file      Family History:     Family History   Problem Relation Age of Onset   • Diabetes type I Brother    • Diabetes Brother         Type I diagnosed in 2012 (age 13)   • "Thyroid disease Mother         I believe takes Thyroid medication   • Hypertension Father    • Heart disease Maternal Grandfather    • Diabetes Maternal Grandmother         Developed at old age   • Hypertension Paternal Grandmother    • Heart disease Paternal Grandmother    • Diabetes Paternal Grandmother         Developed at old age      Current Medications:     No current outpatient medications on file  No current facility-administered medications for this visit  Allergies: Allergies   Allergen Reactions   • Penicillins Rash      Physical Exam:     /78   Pulse 76   Ht 5' 11\" (1 803 m)   Wt 128 kg (283 lb)   SpO2 99%   BMI 39 47 kg/m²     Physical Exam  Constitutional:       General: He is not in acute distress  Appearance: Normal appearance  He is obese  HENT:      Head: Normocephalic and atraumatic  Right Ear: Tympanic membrane, ear canal and external ear normal  There is no impacted cerumen  Left Ear: Tympanic membrane, ear canal and external ear normal  There is no impacted cerumen  Nose: Nose normal  No congestion  Mouth/Throat:      Mouth: Mucous membranes are moist       Pharynx: Oropharynx is clear  No oropharyngeal exudate or posterior oropharyngeal erythema  Eyes:      Extraocular Movements: Extraocular movements intact  Conjunctiva/sclera: Conjunctivae normal       Pupils: Pupils are equal, round, and reactive to light  Cardiovascular:      Rate and Rhythm: Normal rate and regular rhythm  Heart sounds: Normal heart sounds  No murmur heard  No friction rub  No gallop  Pulmonary:      Effort: Pulmonary effort is normal  No respiratory distress  Breath sounds: Normal breath sounds  No wheezing  Abdominal:      General: Bowel sounds are normal       Palpations: Abdomen is soft  Tenderness: There is no abdominal tenderness  There is no guarding  Musculoskeletal:         General: Normal range of motion        Cervical back: " Normal range of motion and neck supple  Lymphadenopathy:      Cervical: No cervical adenopathy  Skin:     General: Skin is warm and dry  Findings: No erythema or rash  Neurological:      General: No focal deficit present  Mental Status: He is alert and oriented to person, place, and time  Mental status is at baseline  Psychiatric:         Mood and Affect: Mood normal          Behavior: Behavior normal          Thought Content:  Thought content normal          Judgment: Judgment normal           Joya Speaker, DO   [address] [facility]

## 2023-06-15 ENCOUNTER — RA CDI HCC (OUTPATIENT)
Dept: OTHER | Facility: HOSPITAL | Age: 33
End: 2023-06-15

## 2023-06-15 NOTE — PROGRESS NOTES
[facility]  coding opportunities       Chart reviewed, no opportunity found: CHART REVIEWED, NO OPPORTUNITY FOUND        Patients Insurance        Commercial Insurance: Philip Price

## 2023-06-22 ENCOUNTER — OFFICE VISIT (OUTPATIENT)
Age: 33
End: 2023-06-22

## 2023-06-22 VITALS
SYSTOLIC BLOOD PRESSURE: 130 MMHG | BODY MASS INDEX: 39.62 KG/M2 | HEART RATE: 76 BPM | HEIGHT: 71 IN | WEIGHT: 283 LBS | DIASTOLIC BLOOD PRESSURE: 78 MMHG | OXYGEN SATURATION: 99 %

## 2023-06-22 DIAGNOSIS — R22.41 SKIN LUMP OF LEG, RIGHT: ICD-10-CM

## 2023-06-22 DIAGNOSIS — F41.9 ANXIETY: ICD-10-CM

## 2023-06-22 DIAGNOSIS — Z00.00 ANNUAL PHYSICAL EXAM: Primary | ICD-10-CM

## 2023-06-22 DIAGNOSIS — E66.9 CLASS 2 OBESITY WITH BODY MASS INDEX (BMI) OF 39.0 TO 39.9 IN ADULT, UNSPECIFIED OBESITY TYPE, UNSPECIFIED WHETHER SERIOUS COMORBIDITY PRESENT: ICD-10-CM

## 2023-06-22 PROBLEM — E66.09 CLASS 2 OBESITY DUE TO EXCESS CALORIES WITHOUT SERIOUS COMORBIDITY WITH BODY MASS INDEX (BMI) OF 39.0 TO 39.9 IN ADULT: Status: ACTIVE | Noted: 2021-08-30

## 2023-06-22 LAB — SL AMB POCT HEMOGLOBIN AIC: 4.9 (ref ?–6.5)

## 2023-06-22 PROCEDURE — 83036 HEMOGLOBIN GLYCOSYLATED A1C: CPT | Performed by: STUDENT IN AN ORGANIZED HEALTH CARE EDUCATION/TRAINING PROGRAM

## 2023-06-22 PROCEDURE — 99395 PREV VISIT EST AGE 18-39: CPT | Performed by: STUDENT IN AN ORGANIZED HEALTH CARE EDUCATION/TRAINING PROGRAM

## 2023-06-22 NOTE — ASSESSMENT & PLAN NOTE
GERI score of 7  He has noted improvements in his anxiety from getting adjusted to his new job  His symptom is mainly that he feels his mind races and he gets lost in his thoughts sometimes    He does not have any concerns currently

## 2023-06-22 NOTE — PATIENT INSTRUCTIONS
It was a pleasure to see you today! Above is a summary of your visit  If you receive an email link to evaluate your experience today, we would appreciate it if you took a few minutes to fill it out  Your feedback is very important to us  Have a great rest of your day!

## 2023-06-23 ENCOUNTER — CLINICAL SUPPORT (OUTPATIENT)
Age: 33
End: 2023-06-23

## 2023-06-23 DIAGNOSIS — Z00.00 ANNUAL PHYSICAL EXAM: ICD-10-CM

## 2023-06-23 DIAGNOSIS — F41.9 ANXIETY: ICD-10-CM

## 2023-06-23 DIAGNOSIS — E66.9 CLASS 2 OBESITY WITH BODY MASS INDEX (BMI) OF 39.0 TO 39.9 IN ADULT, UNSPECIFIED OBESITY TYPE, UNSPECIFIED WHETHER SERIOUS COMORBIDITY PRESENT: ICD-10-CM

## 2023-06-23 DIAGNOSIS — E66.09 CLASS 2 OBESITY DUE TO EXCESS CALORIES WITHOUT SERIOUS COMORBIDITY WITH BODY MASS INDEX (BMI) OF 39.0 TO 39.9 IN ADULT: ICD-10-CM

## 2023-06-23 PROCEDURE — 36415 COLL VENOUS BLD VENIPUNCTURE: CPT

## 2023-06-24 LAB
ALBUMIN SERPL-MCNC: 4.6 G/DL (ref 3.6–5.1)
ALBUMIN/GLOB SERPL: 1.5 (CALC) (ref 1–2.5)
ALP SERPL-CCNC: 95 U/L (ref 36–130)
ALT SERPL-CCNC: 82 U/L (ref 9–46)
AST SERPL-CCNC: 44 U/L (ref 10–40)
BASOPHILS # BLD AUTO: 53 CELLS/UL (ref 0–200)
BASOPHILS NFR BLD AUTO: 0.7 %
BILIRUB SERPL-MCNC: 0.7 MG/DL (ref 0.2–1.2)
BUN SERPL-MCNC: 16 MG/DL (ref 7–25)
BUN/CREAT SERPL: ABNORMAL (CALC) (ref 6–22)
CALCIUM SERPL-MCNC: 9.7 MG/DL (ref 8.6–10.3)
CHLORIDE SERPL-SCNC: 105 MMOL/L (ref 98–110)
CHOLEST SERPL-MCNC: 222 MG/DL
CHOLEST/HDLC SERPL: 6 (CALC)
CO2 SERPL-SCNC: 27 MMOL/L (ref 20–32)
CREAT SERPL-MCNC: 1.15 MG/DL (ref 0.6–1.26)
EOSINOPHIL # BLD AUTO: 203 CELLS/UL (ref 15–500)
EOSINOPHIL NFR BLD AUTO: 2.7 %
ERYTHROCYTE [DISTWIDTH] IN BLOOD BY AUTOMATED COUNT: 12.8 % (ref 11–15)
GFR/BSA.PRED SERPLBLD CYS-BASED-ARV: 86 ML/MIN/1.73M2
GLOBULIN SER CALC-MCNC: 3 G/DL (CALC) (ref 1.9–3.7)
GLUCOSE SERPL-MCNC: 97 MG/DL (ref 65–99)
HCT VFR BLD AUTO: 47.5 % (ref 38.5–50)
HDLC SERPL-MCNC: 37 MG/DL
HGB BLD-MCNC: 15.9 G/DL (ref 13.2–17.1)
LDLC SERPL CALC-MCNC: 152 MG/DL (CALC)
LYMPHOCYTES # BLD AUTO: 2483 CELLS/UL (ref 850–3900)
LYMPHOCYTES NFR BLD AUTO: 33.1 %
MCH RBC QN AUTO: 28.9 PG (ref 27–33)
MCHC RBC AUTO-ENTMCNC: 33.5 G/DL (ref 32–36)
MCV RBC AUTO: 86.4 FL (ref 80–100)
MONOCYTES # BLD AUTO: 510 CELLS/UL (ref 200–950)
MONOCYTES NFR BLD AUTO: 6.8 %
NEUTROPHILS # BLD AUTO: 4253 CELLS/UL (ref 1500–7800)
NEUTROPHILS NFR BLD AUTO: 56.7 %
NONHDLC SERPL-MCNC: 185 MG/DL (CALC)
PLATELET # BLD AUTO: 262 THOUSAND/UL (ref 140–400)
PMV BLD REES-ECKER: 9.6 FL (ref 7.5–12.5)
POTASSIUM SERPL-SCNC: 4.7 MMOL/L (ref 3.5–5.3)
PROT SERPL-MCNC: 7.6 G/DL (ref 6.1–8.1)
RBC # BLD AUTO: 5.5 MILLION/UL (ref 4.2–5.8)
SODIUM SERPL-SCNC: 142 MMOL/L (ref 135–146)
TRIGL SERPL-MCNC: 191 MG/DL
WBC # BLD AUTO: 7.5 THOUSAND/UL (ref 3.8–10.8)

## 2023-06-26 ENCOUNTER — PATIENT OUTREACH (OUTPATIENT)
Age: 33
End: 2023-06-26

## 2023-06-26 NOTE — PROGRESS NOTES
Called Patient to introduce them to care management program  No answer, voicemail left with callback information  My Chart message sent as well with request to respond with best day and time for outreach  Will reach out again if no return response

## 2023-06-28 ENCOUNTER — PATIENT OUTREACH (OUTPATIENT)
Dept: FAMILY MEDICINE CLINIC | Facility: CLINIC | Age: 33
End: 2023-06-28

## 2023-06-28 NOTE — PROGRESS NOTES
Patient responded to [employer] regarding scheduling an initial outreach with care management  I responded with dates and times and patient responded back with confirmation of one  I confirmed with him that I would be calling and from what number and asked that he work on drinking water until we speak   Initial outreach 7/10

## 2023-07-10 ENCOUNTER — PATIENT OUTREACH (OUTPATIENT)
Age: 33
End: 2023-07-10

## 2023-07-10 NOTE — PROGRESS NOTES
Spoke with patient during scheduled outreach. This was our first conversation since he responded through [employer] to schedule initial call. I provided a general overview of the program and patient consented for participation. He has not followed any programs in the past, but was able to maintain weight by being active when he was younger. Since getting  in 2016 and having a 3yr old and 3wk old it has been hard to focus on his weight. He is not drinking much water and loves his diet soda. I asked that he step away from the soda and gave him alternative carbonated beverages. I also gave him ways to flavor water so that he can drink 80oz a day. He also said he downloaded the lose it jaymie and paid for the program. At this time he is at 2100 during week days and 2400 on weekends. I suggested he work on water for 1 week then try to tighten up the calories to 2183-1833 everyday. He stated that snacks in the house are a temptation. I suggested that anything less healthy be put in cabinet or drawer so that it is not easily seen.  Next outreach 7/25

## 2023-07-25 ENCOUNTER — PATIENT OUTREACH (OUTPATIENT)
Age: 33
End: 2023-07-25

## 2023-08-10 ENCOUNTER — PATIENT OUTREACH (OUTPATIENT)
Dept: FAMILY MEDICINE CLINIC | Facility: CLINIC | Age: 33
End: 2023-08-10

## 2023-08-10 NOTE — PROGRESS NOTES
Patient did not answer phone on 7/25 nor respond back to message left to reschedule via phone or [employer]. Mychart message sent today letting patient know that his care management case has been closed and that if he can commit to the outreaches and working on goals in the future to reduce cholesterol and weight to reach back out.

## 2024-09-27 NOTE — PROGRESS NOTES
Adult Annual Physical  Name: Ruben Espinoza      : 1990      MRN: [id number]  Encounter Provider: Lewis Rocha DO  Encounter Date: 10/7/2024   Encounter department: [facility] IN PARTNERSHIP WITH [facility]    Assessment & Plan  Annual physical exam    Orders:    CBC and differential; Future    Comprehensive metabolic panel; Future    Lipid Panel with Direct LDL reflex; Future    POCT hemoglobin A1c    Encounter for vaccination  Patient up-to-date with flu and COVID vaccines.       Class 2 obesity due to excess calories without serious comorbidity with body mass index (BMI) of 39.0 to 39.9 in adult  Patient has had some frustration regarding weight as he is maintaining his weight but is having difficulty with hunger and doing the exercise activities that he would like to such as biking because of his weight.  Will refer to care management to start working again with patient  Orders:    CBC and differential; Future    Comprehensive metabolic panel; Future    Lipid Panel with Direct LDL reflex; Future    POCT hemoglobin A1c    TSH, 3rd generation with Free T4 reflex; Future    Ambulatory referral to complex care management program; Future    Immunizations and preventive care screenings were discussed with patient today. Appropriate education was printed on patient's after visit summary.    Counseling:  Dental Health: discussed importance of regular tooth brushing, flossing, and dental visits.  Exercise: the importance of regular exercise/physical activity was discussed. Recommend exercise 3-5 times per week for at least 30 minutes.       Depression Screening and Follow-up Plan: Patient was screened for depression during today's encounter. They screened negative with a PHQ-2 score of 0.        Patient was seen today for an annual physical exam. Age appropriate screening tests were done as above. Annual labs were ordered to be done through [employer]. Patient will be  "contacted regarding results and follow up will be based on findings. Patient was counseled on the importance of proper diet and exercise. I recommend diets rich in lean meats and leafy green vegetables. Try to have 150 minutes of exercise weekly at moderate intensity. See problem based charting for any chronic problems or new findings and current workup/management.    40 minutes were spent on chart review, examination, and plan of care. This note was dictated using software.     History of Present Illness     Adult Annual Physical:  Patient presents for annual physical.     Diet and Physical Activity:  - Diet/Nutrition:. diet, tried to count calories  - Exercise: walking and moderate cardiovascular exercise.    Depression Screening:  - PHQ-2 Score: 0    General Health:  - Sleep: sleeps well.  - Hearing: normal hearing bilateral ears.  - Vision: wears glasses.  - Dental: regular dental visits.    Review of Systems   Constitutional:  Negative for fever.   Respiratory:  Negative for shortness of breath.    Cardiovascular:  Negative for chest pain.   Gastrointestinal:  Negative for abdominal pain, constipation and diarrhea.   Genitourinary:  Negative for difficulty urinating.   Skin:  Negative for rash.       Objective     /80   Pulse 91   Temp 98.4 °F (36.9 °C)   Ht 5' 11\" (1.803 m)   Wt 130 kg (286 lb)   SpO2 97%   BMI 39.89 kg/m²     Physical Exam  Constitutional:       General: He is not in acute distress.     Appearance: Normal appearance. He is obese.   HENT:      Head: Normocephalic and atraumatic.      Right Ear: Tympanic membrane, ear canal and external ear normal.      Left Ear: Tympanic membrane, ear canal and external ear normal.      Nose: Nose normal. No congestion.      Mouth/Throat:      Mouth: Mucous membranes are moist.      Pharynx: Oropharynx is clear. No oropharyngeal exudate or posterior oropharyngeal erythema.   Eyes:      Extraocular Movements: Extraocular movements intact.      " Conjunctiva/sclera: Conjunctivae normal.      Pupils: Pupils are equal, round, and reactive to light.   Cardiovascular:      Rate and Rhythm: Normal rate and regular rhythm.      Heart sounds: Normal heart sounds. No murmur heard.     No friction rub. No gallop.   Pulmonary:      Effort: Pulmonary effort is normal. No respiratory distress.      Breath sounds: Normal breath sounds. No wheezing.   Abdominal:      General: Abdomen is flat.      Palpations: Abdomen is soft.      Tenderness: There is no abdominal tenderness. There is no guarding.   Musculoskeletal:         General: Normal range of motion.      Cervical back: Normal range of motion and neck supple.   Lymphadenopathy:      Cervical: No cervical adenopathy.   Skin:     General: Skin is warm and dry.      Findings: No erythema or rash.   Neurological:      General: No focal deficit present.      Mental Status: He is alert and oriented to person, place, and time. Mental status is at baseline.   Psychiatric:         Mood and Affect: Mood normal.         Behavior: Behavior normal.         Thought Content: Thought content normal.         Judgment: Judgment normal.

## 2024-09-27 NOTE — ASSESSMENT & PLAN NOTE
Patient has had some frustration regarding weight as he is maintaining his weight but is having difficulty with hunger and doing the exercise activities that he would like to such as biking because of his weight.  Will refer to care management to start working again with patient  Orders:    CBC and differential; Future    Comprehensive metabolic panel; Future    Lipid Panel with Direct LDL reflex; Future    POCT hemoglobin A1c    TSH, 3rd generation with Free T4 reflex; Future    Ambulatory referral to complex care management program; Future

## 2024-09-30 ENCOUNTER — RA CDI HCC (OUTPATIENT)
Dept: OTHER | Facility: HOSPITAL | Age: 34
End: 2024-09-30

## 2024-10-07 ENCOUNTER — OFFICE VISIT (OUTPATIENT)
Age: 34
End: 2024-10-07

## 2024-10-07 VITALS
TEMPERATURE: 98.4 F | HEART RATE: 91 BPM | BODY MASS INDEX: 40.04 KG/M2 | WEIGHT: 286 LBS | SYSTOLIC BLOOD PRESSURE: 130 MMHG | DIASTOLIC BLOOD PRESSURE: 80 MMHG | OXYGEN SATURATION: 97 % | HEIGHT: 71 IN

## 2024-10-07 DIAGNOSIS — Z23 ENCOUNTER FOR VACCINATION: ICD-10-CM

## 2024-10-07 DIAGNOSIS — E66.09 CLASS 2 OBESITY DUE TO EXCESS CALORIES WITHOUT SERIOUS COMORBIDITY WITH BODY MASS INDEX (BMI) OF 39.0 TO 39.9 IN ADULT: ICD-10-CM

## 2024-10-07 DIAGNOSIS — E66.812 CLASS 2 OBESITY DUE TO EXCESS CALORIES WITHOUT SERIOUS COMORBIDITY WITH BODY MASS INDEX (BMI) OF 39.0 TO 39.9 IN ADULT: ICD-10-CM

## 2024-10-07 DIAGNOSIS — Z00.00 ANNUAL PHYSICAL EXAM: Primary | ICD-10-CM

## 2024-10-07 LAB — SL AMB POCT HEMOGLOBIN AIC: 5.1 (ref ?–6.5)

## 2024-10-07 PROCEDURE — 99213 OFFICE O/P EST LOW 20 MIN: CPT | Performed by: STUDENT IN AN ORGANIZED HEALTH CARE EDUCATION/TRAINING PROGRAM

## 2024-10-07 PROCEDURE — 83036 HEMOGLOBIN GLYCOSYLATED A1C: CPT | Performed by: STUDENT IN AN ORGANIZED HEALTH CARE EDUCATION/TRAINING PROGRAM

## 2024-10-07 PROCEDURE — 99395 PREV VISIT EST AGE 18-39: CPT | Performed by: STUDENT IN AN ORGANIZED HEALTH CARE EDUCATION/TRAINING PROGRAM

## 2024-10-08 ENCOUNTER — PATIENT OUTREACH (OUTPATIENT)
Age: 34
End: 2024-10-08

## 2024-10-08 NOTE — PROGRESS NOTES
Called Patient to introduce them to care management program. No answer, voicemail left with callback information. My Chart message sent as well with request to respond with best day and time for outreach. Will reach out again if no return response.    Patient responded to schedule outreach. Day option sent.

## 2024-10-16 ENCOUNTER — PATIENT OUTREACH (OUTPATIENT)
Dept: FAMILY MEDICINE CLINIC | Facility: CLINIC | Age: 34
End: 2024-10-16

## 2024-10-16 NOTE — PROGRESS NOTES
Spoke with patient during scheduled outreach. He has been using the lose it jaymie intermittently since we last spoke summer 2023. He said he just keeps losing and regaining the same 10lbs, but has still been around 2000 calories. Explained that their was more work to be done when we last spoke and he disappeared. Last week he ate under 2200 calories. Goal is to be at 5234-7141 calories by next phone outreach. Talked about ways to boost protein and me mindful of fats when choosing what to eat. Asked that he also get back on the water train. Patient verbalized understanding. Next phone outreach 11/13

## 2024-11-13 ENCOUNTER — PATIENT OUTREACH (OUTPATIENT)
Dept: FAMILY MEDICINE CLINIC | Facility: CLINIC | Age: 34
End: 2024-11-13

## 2024-11-13 NOTE — PROGRESS NOTES
Spoke with patient during scheduled outreach. HE lost 8lbs since last outreach. Patient has been at consistent 2000 calories, but is still working on water intake. Discussed sipping vs gulping. Also, discussed how to eat at the holidays. New goal 1900. Patient verbalized understanding. Next phone outreach 12/11

## 2024-12-09 ENCOUNTER — TELEPHONE (OUTPATIENT)
Dept: FAMILY MEDICINE CLINIC | Facility: CLINIC | Age: 34
End: 2024-12-09

## 2024-12-09 ENCOUNTER — CLINICAL SUPPORT (OUTPATIENT)
Dept: FAMILY MEDICINE CLINIC | Facility: CLINIC | Age: 34
End: 2024-12-09

## 2024-12-09 DIAGNOSIS — Z00.00 ANNUAL PHYSICAL EXAM: ICD-10-CM

## 2024-12-09 DIAGNOSIS — E66.812 CLASS 2 OBESITY DUE TO EXCESS CALORIES WITHOUT SERIOUS COMORBIDITY WITH BODY MASS INDEX (BMI) OF 39.0 TO 39.9 IN ADULT: ICD-10-CM

## 2024-12-09 DIAGNOSIS — E66.09 CLASS 2 OBESITY DUE TO EXCESS CALORIES WITHOUT SERIOUS COMORBIDITY WITH BODY MASS INDEX (BMI) OF 39.0 TO 39.9 IN ADULT: ICD-10-CM

## 2024-12-09 DIAGNOSIS — E66.812 CLASS 2 OBESITY DUE TO EXCESS CALORIES WITHOUT SERIOUS COMORBIDITY WITH BODY MASS INDEX (BMI) OF 39.0 TO 39.9 IN ADULT: Primary | ICD-10-CM

## 2024-12-09 DIAGNOSIS — E66.09 CLASS 2 OBESITY DUE TO EXCESS CALORIES WITHOUT SERIOUS COMORBIDITY WITH BODY MASS INDEX (BMI) OF 39.0 TO 39.9 IN ADULT: Primary | ICD-10-CM

## 2024-12-09 PROCEDURE — 36415 COLL VENOUS BLD VENIPUNCTURE: CPT

## 2024-12-09 NOTE — PROGRESS NOTES
Venipuncture performed. Patient was a difficult stick I was only able to get 2 SST's for THS, Lipid & CMP.

## 2024-12-09 NOTE — TELEPHONE ENCOUNTER
Patient came in for blood work and was a difficult stick. Venipuncture performed. I was only able to get 2 SST's for THS, Lipid & CMP. Unable to draw CBC. Patient scheduled to come back for a nurse visit to draw CBC on 12/16.     Are you able to place a new order for a CBC as future?    Cali Cherry, A

## 2024-12-10 LAB
ALBUMIN SERPL-MCNC: 4.5 G/DL (ref 3.6–5.1)
ALBUMIN/GLOB SERPL: 1.7 (CALC) (ref 1–2.5)
ALP SERPL-CCNC: 99 U/L (ref 36–130)
ALT SERPL-CCNC: 59 U/L (ref 9–46)
AST SERPL-CCNC: 32 U/L (ref 10–40)
BILIRUB SERPL-MCNC: 0.6 MG/DL (ref 0.2–1.2)
BUN SERPL-MCNC: 12 MG/DL (ref 7–25)
BUN/CREAT SERPL: ABNORMAL (CALC) (ref 6–22)
CALCIUM SERPL-MCNC: 9.4 MG/DL (ref 8.6–10.3)
CHLORIDE SERPL-SCNC: 104 MMOL/L (ref 98–110)
CHOLEST SERPL-MCNC: 187 MG/DL
CHOLEST/HDLC SERPL: 5.7 (CALC)
CO2 SERPL-SCNC: 26 MMOL/L (ref 20–32)
CREAT SERPL-MCNC: 1.08 MG/DL (ref 0.6–1.26)
GFR/BSA.PRED SERPLBLD CYS-BASED-ARV: 92 ML/MIN/1.73M2
GLOBULIN SER CALC-MCNC: 2.6 G/DL (CALC) (ref 1.9–3.7)
GLUCOSE SERPL-MCNC: 90 MG/DL (ref 65–99)
HDLC SERPL-MCNC: 33 MG/DL
LDLC SERPL CALC-MCNC: 120 MG/DL (CALC)
NONHDLC SERPL-MCNC: 154 MG/DL (CALC)
POTASSIUM SERPL-SCNC: 4.4 MMOL/L (ref 3.5–5.3)
PROT SERPL-MCNC: 7.1 G/DL (ref 6.1–8.1)
SODIUM SERPL-SCNC: 141 MMOL/L (ref 135–146)
TRIGL SERPL-MCNC: 220 MG/DL
TSH SERPL-ACNC: 0.91 MIU/L (ref 0.4–4.5)

## 2024-12-11 ENCOUNTER — RESULTS FOLLOW-UP (OUTPATIENT)
Dept: FAMILY MEDICINE CLINIC | Facility: CLINIC | Age: 34
End: 2024-12-11

## 2024-12-11 ENCOUNTER — PATIENT OUTREACH (OUTPATIENT)
Dept: FAMILY MEDICINE CLINIC | Facility: CLINIC | Age: 34
End: 2024-12-11

## 2024-12-11 NOTE — PROGRESS NOTES
Spoke with patient during scheduled outreach. He maintained over the last month and wasn't able to stay at 1900 goal. During his samantha break he would like to start exercising.We discussed maintaining through the holidays rather than focusing on losing. Suggested goal of [id number] on the days that is possible. Also gave him goal of eating 100g of protein a day. Patient stated he does't eat red meat and hasn't for a long time. He also cut out caffeine completely and is amazed at how much better he feels without it. Next phone outreach 1/8

## 2024-12-16 ENCOUNTER — CLINICAL SUPPORT (OUTPATIENT)
Dept: FAMILY MEDICINE CLINIC | Facility: CLINIC | Age: 34
End: 2024-12-16

## 2024-12-16 DIAGNOSIS — E66.812 OBESITY, CLASS II, BMI 35-39.9: Primary | ICD-10-CM

## 2024-12-16 PROCEDURE — 36415 COLL VENOUS BLD VENIPUNCTURE: CPT

## 2025-01-08 ENCOUNTER — PATIENT OUTREACH (OUTPATIENT)
Age: 35
End: 2025-01-08

## 2025-01-08 NOTE — PROGRESS NOTES
Spoke with patient during scheduled outreach. He did gain 2lbs over the last month, but much better than he would have before. He has gotten back on track with tracking. Discussed sticking with [id number] over next 2 weeks then dropping to [id number] until next outreach. 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 Reviewed labwork and how nutrition and exercise impact different numbers. Patient would like to be more active so discussed things he can do at home or during day to achieve this. Patient verbalized understanding. Next phone outreach 2/5

## 2025-02-05 ENCOUNTER — PATIENT OUTREACH (OUTPATIENT)
Dept: FAMILY MEDICINE CLINIC | Facility: CLINIC | Age: 35
End: 2025-02-05

## 2025-02-05 NOTE — PROGRESS NOTES
Name: Ruben Espinoza      : 1990      MRN: [id number]  Encounter Provider: Lewis Rocha DO  Encounter Date: 2025   Encounter department: [facility] IN PARTNERSHIP WITH [facility]  :  Assessment & Plan  Class 2 obesity due to excess calories without serious comorbidity with body mass index (BMI) of 39.0 to 39.9 in adult  {If prescribing weight loss medication, click here to fill out prior auth smartform and then hit F2 with this smartlist to insert prior auth documentation (Optional):[id number]}         Encounter for weight management                  Patient is a 34-year-old male who presents today to discuss starting weight loss medications.  GLP medications were reviewed including contraindications.  Patient was shown a test pen in office and demonstrated how to administer the medication.  Will refer to clinical pharmacy to discuss both options and make a decision on which medication to start at that time.  After the medication is authorized I will plan for a follow-up with patient in 3 to 4 months for weight check.    History of Present Illness {?Quick Links Encounters * My Last Note * Last Note in Specialty * Snapshot * Since Last Visit * History :27630}  HPI  Review of Systems   Constitutional:  Negative for fever.   Respiratory:  Negative for shortness of breath.    Cardiovascular:  Negative for chest pain.   Gastrointestinal:  Negative for abdominal pain, constipation and diarrhea.   Genitourinary:  Negative for difficulty urinating.   Skin:  Negative for rash.     Patient is a 34-year-old male who is presenting today for a visit to discuss weight loss medications.  He has been working with our care manager on diet and nutrition to try and reduce weight.  The BMI is still significantly elevated and as such we will discuss GLP medications today.  We will do a review of contraindications and patient will be shown a test pen and demonstrate how to administer.   Will refer to clinical pharmacy to discuss with patient and then make a decision on which one to start.    Patient contraindications were reviewed:  Personal or family history of medullary thyroid cancer/MEN2:  Pancreatitis:  Acute gallbladder disease    Cautions:  Gastroparesis:  Severe renal impairment:  Cholelithiasis:  Injection site reactions:    Weight loss Avg:      Objective {?Quick Links Trend Vitals * Enter New Vitals * Results Review * Timeline (Adult) * Labs * Imaging * Cardiology * Procedures * Lung Cancer Screening * Surgical eConsent :88306}  There were no vitals taken for this visit.     Physical Exam  Constitutional:       General: He is not in acute distress.     Appearance: Normal appearance. He is obese.   HENT:      Head: Normocephalic and atraumatic.      Right Ear: External ear normal.      Left Ear: External ear normal.      Nose: Nose normal.      Mouth/Throat:      Mouth: Mucous membranes are moist.   Eyes:      Conjunctiva/sclera: Conjunctivae normal.   Cardiovascular:      Rate and Rhythm: Normal rate and regular rhythm.      Heart sounds: Normal heart sounds. No murmur heard.     No friction rub. No gallop.   Pulmonary:      Effort: Pulmonary effort is normal. No respiratory distress.      Breath sounds: Normal breath sounds. No wheezing.   Skin:     General: Skin is warm and dry.      Findings: No erythema or rash.   Neurological:      General: No focal deficit present.      Mental Status: He is alert and oriented to person, place, and time. Mental status is at baseline.   Psychiatric:         Mood and Affect: Mood normal.         Behavior: Behavior normal.         Thought Content: Thought content normal.         Judgment: Judgment normal.       Administrative Statements {?Quick Links Full Problem List * Level of Service * [facility]}  I have spent a total time of 30 minutes in caring for this patient on the day of the visit/encounter including Instructions for management,  Documenting in the medical record, and Obtaining or reviewing history  .

## 2025-02-05 NOTE — PROGRESS NOTES
Spoke with patient during scheduled outreach. The food noise has been preventing him from being able to lower his calories. We discussed the GLP medication. Reviewed mechanism of action and Adverse effects. Patient feels this maybe what he needs in order to quiet the noise and be more successful with food choices. Set appt with provider 2/13 to discuss. Let him know he would then speak with pharmacist and let me know when he gets the med if a lot earlier than our next outreach 3/5. Patient verbalized understanding.

## 2025-02-12 NOTE — PROGRESS NOTES
Virtual Regular Visit  Name: Ruben Espinoza      : 1990      MRN: [id number]  Encounter Provider: Lewis Rocha DO  Encounter Date: 2025   Encounter department: [facility] IN PARTNERSHIP WITH [facility]      Verification of patient location:  Patient is located at Other in the following state in which I hold an active license [place] :  Assessment & Plan  Class 2 obesity due to excess calories without serious comorbidity with body mass index (BMI) of 39.0 to 39.9 in adult      Orders:    Ambulatory referral to clinical pharmacy; Future    Encounter for weight management    Orders:    Ambulatory referral to clinical pharmacy; Future      BMI Counseling: There is no height or weight on file to calculate BMI. The BMI is above normal. Nutrition recommendations include encouraging healthy choices of fruits and vegetables. Exercise recommendations include exercising 3-5 times per week. Rationale for BMI follow-up plan is due to patient being overweight or obese.         Patient is a 34-year-old male who presents today to discuss starting weight loss medications. GLP medications were reviewed including contraindications. Patient was directed to a video on glp  website demonstrating how to inject pen. Will refer to clinical pharmacy to discuss both options and make a decision on which medication to start at that time. After the medication is authorized I will plan for a follow-up with patient in 3 to 4 months for weight check.     Encounter provider Lewis Rocha DO    The patient was identified by name and date of birth. Ruben Espinoza was informed that this is a telemedicine visit and that the visit is being conducted through the Epic Embedded platform. He agrees to proceed..  My office door was closed. No one else was in the room.  He acknowledged consent and understanding of privacy and security of the video platform. The patient has agreed to participate and  understands they can discontinue the visit at any time.    Patient is aware this is a billable service.     History of Present Illness     HPI  Review of Systems   Constitutional:  Negative for fever.   Respiratory:  Negative for shortness of breath.    Cardiovascular:  Negative for chest pain.   Gastrointestinal:  Negative for abdominal pain, constipation and diarrhea.   Genitourinary:  Negative for difficulty urinating.   Skin:  Negative for rash.     Patient is a 34-year-old male who is presenting today for a visit to discuss weight loss medications.  He has been working with our care manager on diet and nutrition to try and reduce weight.  The BMI is still significantly elevated and as such we will discuss GLP medications today.  We will do a review of contraindications and patient will be shown a video to demonstrate how to administer.  Will refer to clinical pharmacy to discuss with patient and then make a decision on which one to start.    Patient contraindications were reviewed:  Personal or family history of medullary thyroid cancer/MEN2: none  Pancreatitis: none  Acute gallbladder disease: none    Cautions:  Gastroparesis: none  Severe renal impairment: none  Cholelithiasis: none  Injection site reactions: none      Objective   There were no vitals taken for this visit.    Physical Exam  Constitutional:       General: He is not in acute distress.     Appearance: Normal appearance. He is obese.   HENT:      Head: Normocephalic and atraumatic.      Right Ear: External ear normal.      Left Ear: External ear normal.      Nose: Nose normal.   Eyes:      Conjunctiva/sclera: Conjunctivae normal.   Pulmonary:      Effort: Pulmonary effort is normal. No respiratory distress.   Skin:     Findings: No erythema or rash.   Neurological:      General: No focal deficit present.      Mental Status: He is alert and oriented to person, place, and time. Mental status is at baseline.   Psychiatric:         Mood and Affect:  Mood normal.         Behavior: Behavior normal.         Thought Content: Thought content normal.         Judgment: Judgment normal.         Visit Time  Total Visit Duration: 15

## 2025-02-13 ENCOUNTER — TELEMEDICINE (OUTPATIENT)
Age: 35
End: 2025-02-13

## 2025-02-13 DIAGNOSIS — Z76.89 ENCOUNTER FOR WEIGHT MANAGEMENT: ICD-10-CM

## 2025-02-13 DIAGNOSIS — E66.09 CLASS 2 OBESITY DUE TO EXCESS CALORIES WITHOUT SERIOUS COMORBIDITY WITH BODY MASS INDEX (BMI) OF 39.0 TO 39.9 IN ADULT: Primary | ICD-10-CM

## 2025-02-13 DIAGNOSIS — E66.812 CLASS 2 OBESITY DUE TO EXCESS CALORIES WITHOUT SERIOUS COMORBIDITY WITH BODY MASS INDEX (BMI) OF 39.0 TO 39.9 IN ADULT: Primary | ICD-10-CM

## 2025-02-13 PROCEDURE — PBNCHG PB NO CHARGE PLACEHOLDER: Performed by: PHARMACIST

## 2025-02-13 PROCEDURE — 99213 OFFICE O/P EST LOW 20 MIN: CPT | Performed by: STUDENT IN AN ORGANIZED HEALTH CARE EDUCATION/TRAINING PROGRAM

## 2025-02-13 RX ORDER — TIRZEPATIDE 2.5 MG/.5ML
2.5 INJECTION, SOLUTION SUBCUTANEOUS WEEKLY
Qty: 2 ML | Refills: 0 | Status: SHIPPED | OUTPATIENT
Start: 2025-02-13 | End: 2025-02-18 | Stop reason: SDUPTHER

## 2025-02-13 RX ORDER — DOXYCYCLINE HYCLATE 100 MG
TABLET ORAL
COMMUNITY
Start: 2025-02-12

## 2025-02-13 NOTE — PATIENT INSTRUCTIONS
Start Zepbound 2.5 mg injection once weekly.    Prior authorization process can take up to 7 business days to submit.  If you do not hear from our office within 7 days please reach out.     You can go to [email or web address] for cost savings information and coupon card.    Once you obtain the medication from the pharmacy please contact Marcella Adames RN to plan follow-up call.

## 2025-02-13 NOTE — PROGRESS NOTES
[facility]  Tam Sevilla     uRben Espinoza is a 34 y.o. male who was referred to the pharmacist for weight management medication education referred by Lweis Rocha DO .    Administrative Statements   Encounter provider Tam Sevilla    The Patient is located at Home and in the following state in which I hold an active license [place].    The patient was identified by name and date of birth. Ruben Espinoza was informed that this is a telemedicine visit and that the visit is being conducted through the Epic Embedded platform. He agrees to proceed..  My office door was closed. No one else was in the room.  He acknowledged consent and understanding of privacy and security of the video platform. The patient has agreed to participate and understands they can discontinue the visit at any time.    Assessment/ Plan      Assessment & Plan  Class 2 obesity due to excess calories without serious comorbidity with body mass index (BMI) of 39.0 to 39.9 in adult  Prior Authorization Clinical Questions for Weight Management Pharmacotherapy    1. Does the patient have a contrainidcation to medication prescribed for weight management?: No  2. Does the patient have a diagnosis of obesity, confirmed by a BMI greater than or equal to 30 kg/m^2?: Yes  3. Does the patient have a BMI of greater than or equal to 27 kg/m^2 with at least one weight-related comorbidity/risk factor/complication (e.g. diabetes, dyslipidemia, coronary artery disease)?: No  5. WEGOVY CVA Indication: Does patient have established documented cardiovascular disease (history of a prior heart attack (myocardial infarction), stroke, or symptomatic peripheral arterial disease (PAD)?: N/A  6. ZEPBOUND JENNIFER Indication: Does patient have documented JENNIFER diagnosed via sleep study (insurance will require copy of sleep study results for approval)?: N/A  7. Has the patient been on a weight loss regimen of low-calorie  "diet, increased physical activity, and lifestyle modifications for a minimum of 6 months?: Yes  8. Has the patient completed a comprehensive weight loss program (ie, Weight Watchers, Noom, Bariatrics, other jaymie on phone)? If so, what?: Yes  9. Does the patient have a history of type 2 diabetes?: No  10. Has the member tried and failed other weight loss medication within the past 12 months?: No  11. Will the member use requested medication in combination with another GLP agonist or weight loss drug?: No  12. Is the medication a controlled substance?: No     Baseline weight (in pounds): 286 lbs         Baseline BMI:  Estimated body mass index is 39.89 kg/m² as calculated from the following:    Height as of 10/7/24: 5' 11\" (1.803 m).    Weight as of 10/7/24: 130 kg (286 lb).  Initiation or Continuation of Therapy: Initiation   Contraindications to pharmacotherapy for weight management:  Pancreatitis: no  MEN2/ MTC: no  Seizure: no  Cardiovascular disease: no  Uncontrolled HTN: no  Opioid use: no  Hyperthyroidism: no  Glaucoma: no  Cholestasis: no   Drug interactions: None identified    Assessment: BMI greater than 30 therefore pharmacotherapy for weight management is indicated.     Educated patient on mechanism of action, potential side effects (N/V/D, constipation, headaches, increased HR), warning symptoms (severe upper abdominal pain or radiating pain towards back, severe N/V, medication storage, administration, and dosing schedule.  Reviewed injection technique with demo pen.    Changes to medication regimen:  Initiate Zepbound 2.5 mg weekly x 4 weeks        Orders:    Ambulatory referral to clinical pharmacy    tirzepatide (Zepbound) 2.5 mg/0.5 mL auto-injector; Inject 0.5 mL (2.5 mg total) under the skin once a week for 28 days       Monitoring: weight on home scale weekly, renal function if severe GI side effects, BP and HR with follow up office visits, routine lipid panel      Subjective        Previous " medications for weight management  None     2. Lifestyle:   In the past 6 months the patient has done the following diet interventions: tacking foods, calorie counting limitation, more protein, reduced fat and sugars, more regular eating habits  In the past 6 months the patient has implemented the following exercise routine: walking frequently with job and outside with kids.       Objective       ASCVD Risk:  The ASCVD Risk score (Vladislav WILKINSON, et al., 2019) failed to calculate for the following reasons:    The 2019 ASCVD risk score is only valid for ages 40 to 79     Vitals:    Wt Readings from Last 5 Encounters:   10/07/24 130 kg (286 lb)   06/22/23 128 kg (283 lb)   04/10/22 122 kg (270 lb)   08/30/21 124 kg (274 lb 3.2 oz)   02/28/20 116 kg (255 lb 15.3 oz)       BP Readings from Last 3 Encounters:   10/07/24 130/80   06/22/23 130/78   04/10/22 159/94       Pulse Readings from Last 3 Encounters:   10/07/24 91   06/22/23 76   04/10/22 90       Labs:  Lab Results   Component Value Date    SODIUM 141 12/09/2024    K 4.4 12/09/2024     12/09/2024    CO2 26 12/09/2024    AGAP 2 (L) 09/03/2021    BUN 12 12/09/2024    CREATININE 1.08 12/09/2024    GLUC 90 12/09/2024    GLUF 97 09/03/2021    CALCIUM 9.4 12/09/2024    AST 32 12/09/2024    ALT 59 (H) 12/09/2024    ALKPHOS 99 12/09/2024    TP 7.1 12/09/2024    TBILI 0.6 12/09/2024    EGFR 92 12/09/2024         Pharmacist Tracking Tool       Pharmacist Tracking Tool  Reason For Outreach: Embedded Pharmacist  Demographics:  Intervention Method: Video  Type of Intervention: New  Topics Addressed: Obesity  Pharmacologic Interventions: Medication Initiation and Med Rec  Non-Pharmacologic Interventions: Disease state education and Medication/Device education  Time:  Direct Patient Care:  30  mins  Care Coordination:  15  mins  Recommendation Recipient: Patient/Caregiver and Provider  Outcome: Accepted

## 2025-02-13 NOTE — ASSESSMENT & PLAN NOTE
"Prior Authorization Clinical Questions for Weight Management Pharmacotherapy    1. Does the patient have a contrainidcation to medication prescribed for weight management?: No  2. Does the patient have a diagnosis of obesity, confirmed by a BMI greater than or equal to 30 kg/m^2?: Yes  3. Does the patient have a BMI of greater than or equal to 27 kg/m^2 with at least one weight-related comorbidity/risk factor/complication (e.g. diabetes, dyslipidemia, coronary artery disease)?: No  5. WEGOVY CVA Indication: Does patient have established documented cardiovascular disease (history of a prior heart attack (myocardial infarction), stroke, or symptomatic peripheral arterial disease (PAD)?: N/A  6. ZEPBOUND JENNIFER Indication: Does patient have documented JENNIFER diagnosed via sleep study (insurance will require copy of sleep study results for approval)?: N/A  7. Has the patient been on a weight loss regimen of low-calorie diet, increased physical activity, and lifestyle modifications for a minimum of 6 months?: Yes  8. Has the patient completed a comprehensive weight loss program (ie, Weight Watchers, Noom, Bariatrics, other jaymie on phone)? If so, what?: Yes  9. Does the patient have a history of type 2 diabetes?: No  10. Has the member tried and failed other weight loss medication within the past 12 months?: No  11. Will the member use requested medication in combination with another GLP agonist or weight loss drug?: No  12. Is the medication a controlled substance?: No     Baseline weight (in pounds): 286 lbs         Baseline BMI:  Estimated body mass index is 39.89 kg/m² as calculated from the following:    Height as of 10/7/24: 5' 11\" (1.803 m).    Weight as of 10/7/24: 130 kg (286 lb).  Initiation or Continuation of Therapy: Initiation   Contraindications to pharmacotherapy for weight management:  Pancreatitis: no  MEN2/ MTC: no  Seizure: no  Cardiovascular disease: no  Uncontrolled HTN: no  Opioid use: no  Hyperthyroidism: " no  Glaucoma: no  Cholestasis: no   Drug interactions: None identified    Assessment: BMI greater than 30 therefore pharmacotherapy for weight management is indicated.     Educated patient on mechanism of action, potential side effects (N/V/D, constipation, headaches, increased HR), warning symptoms (severe upper abdominal pain or radiating pain towards back, severe N/V, medication storage, administration, and dosing schedule.  Reviewed injection technique with demo pen.    Changes to medication regimen:  Initiate Zepbound 2.5 mg weekly x 4 weeks        Orders:    Ambulatory referral to clinical pharmacy    tirzepatide (Zepbound) 2.5 mg/0.5 mL auto-injector; Inject 0.5 mL (2.5 mg total) under the skin once a week for 28 days

## 2025-02-14 ENCOUNTER — TELEMEDICINE (OUTPATIENT)
Dept: FAMILY MEDICINE CLINIC | Facility: CLINIC | Age: 35
End: 2025-02-14

## 2025-02-14 ENCOUNTER — TELEPHONE (OUTPATIENT)
Dept: FAMILY MEDICINE CLINIC | Facility: CLINIC | Age: 35
End: 2025-02-14

## 2025-02-14 DIAGNOSIS — H92.03 OTALGIA OF BOTH EARS: ICD-10-CM

## 2025-02-14 DIAGNOSIS — J02.9 SORE THROAT: ICD-10-CM

## 2025-02-14 DIAGNOSIS — J06.9 UPPER RESPIRATORY TRACT INFECTION, UNSPECIFIED TYPE: Primary | ICD-10-CM

## 2025-02-14 DIAGNOSIS — R42 VERTIGO: ICD-10-CM

## 2025-02-14 PROCEDURE — 99213 OFFICE O/P EST LOW 20 MIN: CPT | Performed by: STUDENT IN AN ORGANIZED HEALTH CARE EDUCATION/TRAINING PROGRAM

## 2025-02-14 RX ORDER — METHYLPREDNISOLONE 4 MG/1
TABLET ORAL
Qty: 21 EACH | Refills: 0 | Status: SHIPPED | OUTPATIENT
Start: 2025-02-14

## 2025-02-14 NOTE — TELEPHONE ENCOUNTER
BATOOL VALERIO (Key: [id number])  PA Case ID #: [id number]  Rx #: [id number]    I called and spoke to patient to let him know Zepbound was approved. I asked patient to call pharmacy to follow up on medication refill. Patient verbalized understanding and had no further questions.    TAMEKA Rodriguez

## 2025-02-14 NOTE — PROGRESS NOTES
Virtual Regular Visit  Name: Ruben Espinoza      : 1990      MRN: [id number]  Encounter Provider: Lewis Rocha DO  Encounter Date: 2025   Encounter department: [facility] PARTNERSHIP [facility]      Verification of patient location:  Patient is located at Other in the following state in which I hold an active license [place] :  Assessment & Plan  Upper respiratory tract infection, unspecified type    Orders:    methylPREDNISolone 4 MG tablet therapy pack; Use as directed on package    Vertigo    Orders:    methylPREDNISolone 4 MG tablet therapy pack; Use as directed on package    Otalgia of both ears    Orders:    methylPREDNISolone 4 MG tablet therapy pack; Use as directed on package    Sore throat    Orders:    methylPREDNISolone 4 MG tablet therapy pack; Use as directed on package        Patient is a 34 year old male presenting with vertigo, congestion, ear pain and sore throat.  Patient has tried Dramamine for the vertigo and it is not helping very much.  He is currently resting in bed and I recommended trying a steroid pack for his symptoms.  This may be related to all the pressure he is having in his ear and sinuses.  I recommended to continue the doxycycline he was prescribed at a televisit by another provider.  Cautioned him to be careful when standing from a seated or lying down position.  I did discuss that in some cases steroids can cause dizziness but if the pressure is the cause of his symptoms it should alleviate it.    Recommend over the counter meclizine as needed.    Encounter provider Lewis Rocha DO    The patient was identified by name and date of birth. Ruben Espinoza was informed that this is a telemedicine visit and that the visit is being conducted through the Epic Embedded platform. He agrees to proceed..  My office door was closed. No one else was in the room.  He acknowledged consent and understanding of privacy and security of  the video platform. The patient has agreed to participate and understands they can discontinue the visit at any time.    Patient is aware this is a billable service.     History of Present Illness     HPI  Review of Systems   Constitutional:  Negative for fever.   HENT:  Positive for congestion, ear pain and sore throat.    Respiratory:  Negative for shortness of breath.    Cardiovascular:  Negative for chest pain.   Gastrointestinal:  Negative for abdominal pain.   Skin:  Negative for rash.   Neurological:  Positive for dizziness.     Patient is a 34-year-old male who has been on doxycycline for upper respiratory type symptoms.  He started developing some ear pain/pressure along with congestion and now episodes of vertigo.  He states that he gets a sensation of the room spinning when he gets up.  He has tried Dramamine but it has not improved his symptoms.    Objective   There were no vitals taken for this visit.    Physical Exam  Constitutional:       General: He is not in acute distress.     Appearance: Normal appearance. He is obese. He is ill-appearing.   HENT:      Head: Normocephalic and atraumatic.      Right Ear: External ear normal.      Left Ear: External ear normal.      Nose: Congestion present.   Eyes:      Conjunctiva/sclera: Conjunctivae normal.   Pulmonary:      Effort: Pulmonary effort is normal. No respiratory distress.   Skin:     Findings: No erythema or rash.   Neurological:      General: No focal deficit present.      Mental Status: He is alert and oriented to person, place, and time. Mental status is at baseline.   Psychiatric:         Mood and Affect: Mood normal.         Behavior: Behavior normal.         Thought Content: Thought content normal.         Judgment: Judgment normal.         Visit Time  Total Visit Duration: 15

## 2025-02-18 ENCOUNTER — PATIENT MESSAGE (OUTPATIENT)
Age: 35
End: 2025-02-18

## 2025-02-18 DIAGNOSIS — E66.09 CLASS 2 OBESITY DUE TO EXCESS CALORIES WITHOUT SERIOUS COMORBIDITY WITH BODY MASS INDEX (BMI) OF 39.0 TO 39.9 IN ADULT: ICD-10-CM

## 2025-02-18 DIAGNOSIS — E66.812 CLASS 2 OBESITY DUE TO EXCESS CALORIES WITHOUT SERIOUS COMORBIDITY WITH BODY MASS INDEX (BMI) OF 39.0 TO 39.9 IN ADULT: ICD-10-CM

## 2025-02-18 RX ORDER — TIRZEPATIDE 2.5 MG/.5ML
2.5 INJECTION, SOLUTION SUBCUTANEOUS WEEKLY
Qty: 2 ML | Refills: 0 | Status: SHIPPED | OUTPATIENT
Start: 2025-02-18 | End: 2025-03-18

## 2025-02-20 ENCOUNTER — PATIENT OUTREACH (OUTPATIENT)
Dept: FAMILY MEDICINE CLINIC | Facility: CLINIC | Age: 35
End: 2025-02-20

## 2025-03-05 ENCOUNTER — PATIENT OUTREACH (OUTPATIENT)
Dept: FAMILY MEDICINE CLINIC | Facility: CLINIC | Age: 35
End: 2025-03-05

## 2025-03-05 DIAGNOSIS — E66.09 CLASS 2 OBESITY DUE TO EXCESS CALORIES WITHOUT SERIOUS COMORBIDITY WITH BODY MASS INDEX (BMI) OF 39.0 TO 39.9 IN ADULT: Primary | ICD-10-CM

## 2025-03-05 DIAGNOSIS — E66.812 CLASS 2 OBESITY DUE TO EXCESS CALORIES WITHOUT SERIOUS COMORBIDITY WITH BODY MASS INDEX (BMI) OF 39.0 TO 39.9 IN ADULT: Primary | ICD-10-CM

## 2025-03-05 RX ORDER — TIRZEPATIDE 5 MG/.5ML
5 INJECTION, SOLUTION SUBCUTANEOUS WEEKLY
Qty: 2 ML | Refills: 0 | Status: SHIPPED | OUTPATIENT
Start: 2025-03-05 | End: 2025-03-13 | Stop reason: SDUPTHER

## 2025-03-05 NOTE — PROGRESS NOTES
Spoke with patient during scheduled outreach. He is doing well on the zepbound. We did review rotating injection sites, tracking bowel movements and when to start an OTC softener. Also, discussed calories intake while on the medication. Patient verbalized understanding. Updated provider

## 2025-03-13 RX ORDER — TIRZEPATIDE 5 MG/.5ML
5 INJECTION, SOLUTION SUBCUTANEOUS WEEKLY
Qty: 2 ML | Refills: 0 | Status: SHIPPED | OUTPATIENT
Start: 2025-03-13

## 2025-03-13 NOTE — PROGRESS NOTES
Patient requested Zepbound to be sent to [facility] instead of [facility]. Rx sent per cpa agreement

## 2025-03-17 ENCOUNTER — PATIENT OUTREACH (OUTPATIENT)
Age: 35
End: 2025-03-17

## 2025-03-17 NOTE — PROGRESS NOTES
Patient sent message concerned about cost of medication this month. Called pharmacy and was told patient has deductible which was now satisfied and it should go back next month. Replied this information back to patient.

## 2025-04-06 DIAGNOSIS — E66.09 CLASS 2 OBESITY DUE TO EXCESS CALORIES WITHOUT SERIOUS COMORBIDITY WITH BODY MASS INDEX (BMI) OF 39.0 TO 39.9 IN ADULT: ICD-10-CM

## 2025-04-06 DIAGNOSIS — E66.812 CLASS 2 OBESITY DUE TO EXCESS CALORIES WITHOUT SERIOUS COMORBIDITY WITH BODY MASS INDEX (BMI) OF 39.0 TO 39.9 IN ADULT: ICD-10-CM

## 2025-04-06 RX ORDER — TIRZEPATIDE 5 MG/.5ML
5 INJECTION, SOLUTION SUBCUTANEOUS WEEKLY
Qty: 2 ML | Refills: 0 | OUTPATIENT
Start: 2025-04-06

## 2025-04-08 ENCOUNTER — PATIENT OUTREACH (OUTPATIENT)
Age: 35
End: 2025-04-08

## 2025-04-08 DIAGNOSIS — E66.812 CLASS 2 OBESITY DUE TO EXCESS CALORIES WITHOUT SERIOUS COMORBIDITY WITH BODY MASS INDEX (BMI) OF 39.0 TO 39.9 IN ADULT: Primary | ICD-10-CM

## 2025-04-08 DIAGNOSIS — E66.09 CLASS 2 OBESITY DUE TO EXCESS CALORIES WITHOUT SERIOUS COMORBIDITY WITH BODY MASS INDEX (BMI) OF 39.0 TO 39.9 IN ADULT: Primary | ICD-10-CM

## 2025-04-08 RX ORDER — TIRZEPATIDE 7.5 MG/.5ML
7.5 INJECTION, SOLUTION SUBCUTANEOUS WEEKLY
Qty: 2 ML | Refills: 0 | Status: SHIPPED | OUTPATIENT
Start: 2025-04-08

## 2025-04-08 NOTE — PROGRESS NOTES
Chart review. Patient has not reached out with any questions or concerns. Updated provider and sent message to patient regarding next dose.

## 2025-05-01 DIAGNOSIS — E66.812 CLASS 2 OBESITY DUE TO EXCESS CALORIES WITHOUT SERIOUS COMORBIDITY WITH BODY MASS INDEX (BMI) OF 39.0 TO 39.9 IN ADULT: ICD-10-CM

## 2025-05-01 DIAGNOSIS — E66.09 CLASS 2 OBESITY DUE TO EXCESS CALORIES WITHOUT SERIOUS COMORBIDITY WITH BODY MASS INDEX (BMI) OF 39.0 TO 39.9 IN ADULT: ICD-10-CM

## 2025-05-01 RX ORDER — TIRZEPATIDE 7.5 MG/.5ML
7.5 INJECTION, SOLUTION SUBCUTANEOUS WEEKLY
Qty: 2 ML | Refills: 0 | OUTPATIENT
Start: 2025-05-01

## 2025-05-05 ENCOUNTER — PATIENT OUTREACH (OUTPATIENT)
Age: 35
End: 2025-05-05

## 2025-05-05 DIAGNOSIS — E66.812 CLASS 2 OBESITY DUE TO EXCESS CALORIES WITHOUT SERIOUS COMORBIDITY WITH BODY MASS INDEX (BMI) OF 39.0 TO 39.9 IN ADULT: Primary | ICD-10-CM

## 2025-05-05 DIAGNOSIS — E66.09 CLASS 2 OBESITY DUE TO EXCESS CALORIES WITHOUT SERIOUS COMORBIDITY WITH BODY MASS INDEX (BMI) OF 39.0 TO 39.9 IN ADULT: Primary | ICD-10-CM

## 2025-05-05 RX ORDER — TIRZEPATIDE 10 MG/.5ML
10 INJECTION, SOLUTION SUBCUTANEOUS WEEKLY
Qty: 2 ML | Refills: 0 | Status: SHIPPED | OUTPATIENT
Start: 2025-05-05

## 2025-05-05 NOTE — PROGRESS NOTES
Patient has not reached out with tolerability concerns. Updated provider, sent message regarding next dose to patient.

## 2025-05-06 ENCOUNTER — PATIENT OUTREACH (OUTPATIENT)
Age: 35
End: 2025-05-06

## 2025-05-06 NOTE — PROGRESS NOTES
Spoke with patient regarding dosing while traveling and change in insurance. Patient verbalized understanding.

## 2025-05-28 ENCOUNTER — PATIENT OUTREACH (OUTPATIENT)
Dept: FAMILY MEDICINE CLINIC | Facility: CLINIC | Age: 35
End: 2025-05-28

## 2025-05-28 DIAGNOSIS — E66.09 CLASS 2 OBESITY DUE TO EXCESS CALORIES WITHOUT SERIOUS COMORBIDITY WITH BODY MASS INDEX (BMI) OF 39.0 TO 39.9 IN ADULT: Primary | ICD-10-CM

## 2025-05-28 DIAGNOSIS — E66.812 CLASS 2 OBESITY DUE TO EXCESS CALORIES WITHOUT SERIOUS COMORBIDITY WITH BODY MASS INDEX (BMI) OF 39.0 TO 39.9 IN ADULT: Primary | ICD-10-CM

## 2025-05-28 DIAGNOSIS — E66.812 CLASS 2 OBESITY DUE TO EXCESS CALORIES WITHOUT SERIOUS COMORBIDITY WITH BODY MASS INDEX (BMI) OF 39.0 TO 39.9 IN ADULT: ICD-10-CM

## 2025-05-28 DIAGNOSIS — E66.09 CLASS 2 OBESITY DUE TO EXCESS CALORIES WITHOUT SERIOUS COMORBIDITY WITH BODY MASS INDEX (BMI) OF 39.0 TO 39.9 IN ADULT: ICD-10-CM

## 2025-05-28 RX ORDER — TIRZEPATIDE 12.5 MG/.5ML
12.5 INJECTION, SOLUTION SUBCUTANEOUS WEEKLY
Qty: 2 ML | Refills: 0 | Status: SHIPPED | OUTPATIENT
Start: 2025-05-28

## 2025-05-28 RX ORDER — TIRZEPATIDE 10 MG/.5ML
10 INJECTION, SOLUTION SUBCUTANEOUS WEEKLY
Qty: 2 ML | Refills: 0 | Status: SHIPPED | OUTPATIENT
Start: 2025-05-28 | End: 2025-05-28 | Stop reason: DRUGHIGH

## 2025-05-28 NOTE — PROGRESS NOTES
Patient has not reached out with any tolerability concerns, updated provider. Sent message to patient with next dose information.

## 2025-06-11 NOTE — PROGRESS NOTES
Name: Ruben Espinoza      : 1990      MRN: [id number]  Encounter Provider: Lewis Rocha DO  Encounter Date: 2025   Encounter department: [facility] IN PARTNERSHIP WITH [facility]  :  Assessment & Plan  Class 2 obesity due to excess calories without serious comorbidity with body mass index (BMI) of 35.0 to 35.9 in adult  Prior Authorization Clinical Questions for Weight Management Pharmacotherapy    2. Does the patient have a diagnosis of obesity, confirmed by a BMI greater than or equal to 30 kg/m^2?: Yes  3. Does the patient have a BMI of greater than or equal to 27 kg/m^2 with at least one weight-related comorbidity/risk factor/complication (e.g. diabetes, dyslipidemia, coronary artery disease)?: No  9. Does the patient have a history of type 2 diabetes?: No     Baseline weight (in pounds): 286 lbs  Current weight (in pounds): 255 lbs  Weight loss percentage: -10.84%         Orders:    CBC and differential; Future    Comprehensive metabolic panel; Future    Lipid Panel with Direct LDL reflex; Future    Encounter for weight management    Orders:    CBC and differential; Future    Comprehensive metabolic panel; Future    Lipid Panel with Direct LDL reflex; Future    Atypical mole  Possible atypical mole on the top left side of the scalp.  I will refer to dermatology for a full skin evaluation as I cannot visualize some of the lesions in the hairline very well with the basic otoscope light.  Orders:    Ambulatory Referral to Dermatology; Future             The patient is presenting for a follow-up on weight management today.  They have been tolerating the medication well.  There have been no to minimal side effects reported.  Patient was again counseled on side effects to monitor for.  They should reach out to our office if they begin to notice any new side effects or changes while on the medication.  We will follow-up for regular visits to monitor  progress.    20 minutes spent on physical exam and plan of care.  This note was dictated using software.     History of Present Illness   HPI  Review of Systems   Constitutional:  Negative for fever.   Respiratory:  Negative for shortness of breath.    Cardiovascular:  Negative for chest pain.   Gastrointestinal:  Negative for abdominal pain, constipation and diarrhea.   Genitourinary:  Negative for difficulty urinating.   Skin:  Negative for rash.        Skin moles of scalp     Patient is presenting for follow-up today on weight loss medications.  They have been doing well. We will do a side effect review:    GI: constipation, bloating  Elevated HR: none  Gallbladder: none  Pancreatitis: none    Patient states that he has a couple moles on the scalp that his spouse was concerned about having looked at.  He denies any pain around the areas and has not noticed any changes in size.    Objective   There were no vitals taken for this visit.     Physical Exam  Constitutional:       General: He is not in acute distress.     Appearance: Normal appearance.   HENT:      Head: Normocephalic and atraumatic.      Right Ear: External ear normal.      Left Ear: External ear normal.      Nose: Nose normal.      Mouth/Throat:      Mouth: Mucous membranes are moist.      Pharynx: Oropharynx is clear.     Eyes:      Extraocular Movements: Extraocular movements intact.      Conjunctiva/sclera: Conjunctivae normal.      Pupils: Pupils are equal, round, and reactive to light.       Cardiovascular:      Rate and Rhythm: Normal rate and regular rhythm.      Heart sounds: Normal heart sounds. No murmur heard.     No friction rub. No gallop.   Pulmonary:      Effort: Pulmonary effort is normal. No respiratory distress.      Breath sounds: Normal breath sounds. No wheezing.     Skin:     General: Skin is warm and dry.      Findings: Lesion (Several skin moles noted throughout the scalp.  There is one on the left close to the midline which  has a slightly raised scaly type of appearance.) present. No erythema or rash.     Neurological:      General: No focal deficit present.      Mental Status: He is alert and oriented to person, place, and time. Mental status is at baseline.     Psychiatric:         Mood and Affect: Mood normal.         Behavior: Behavior normal.         Thought Content: Thought content normal.         Judgment: Judgment normal.       Administrative Statements   I have spent a total time of 20 minutes in caring for this patient on the day of the visit/encounter including Instructions for management, Documenting in the medical record, and Obtaining or reviewing history  .

## 2025-06-11 NOTE — ASSESSMENT & PLAN NOTE
Prior Authorization Clinical Questions for Weight Management Pharmacotherapy    2. Does the patient have a diagnosis of obesity, confirmed by a BMI greater than or equal to 30 kg/m^2?: Yes  3. Does the patient have a BMI of greater than or equal to 27 kg/m^2 with at least one weight-related comorbidity/risk factor/complication (e.g. diabetes, dyslipidemia, coronary artery disease)?: No  9. Does the patient have a history of type 2 diabetes?: No     Baseline weight (in pounds): 286 lbs  Current weight (in pounds): 255 lbs  Weight loss percentage: -10.84%         Orders:    CBC and differential; Future    Comprehensive metabolic panel; Future    Lipid Panel with Direct LDL reflex; Future

## 2025-06-18 ENCOUNTER — OFFICE VISIT (OUTPATIENT)
Dept: FAMILY MEDICINE CLINIC | Facility: CLINIC | Age: 35
End: 2025-06-18

## 2025-06-18 VITALS
BODY MASS INDEX: 35.7 KG/M2 | RESPIRATION RATE: 16 BRPM | HEIGHT: 71 IN | DIASTOLIC BLOOD PRESSURE: 70 MMHG | SYSTOLIC BLOOD PRESSURE: 110 MMHG | HEART RATE: 80 BPM | OXYGEN SATURATION: 99 % | WEIGHT: 255 LBS | TEMPERATURE: 97.7 F

## 2025-06-18 DIAGNOSIS — E66.812 CLASS 2 OBESITY DUE TO EXCESS CALORIES WITHOUT SERIOUS COMORBIDITY WITH BODY MASS INDEX (BMI) OF 35.0 TO 35.9 IN ADULT: Primary | ICD-10-CM

## 2025-06-18 DIAGNOSIS — D22.9 ATYPICAL MOLE: ICD-10-CM

## 2025-06-18 DIAGNOSIS — E66.09 CLASS 2 OBESITY DUE TO EXCESS CALORIES WITHOUT SERIOUS COMORBIDITY WITH BODY MASS INDEX (BMI) OF 35.0 TO 35.9 IN ADULT: Primary | ICD-10-CM

## 2025-06-18 DIAGNOSIS — Z76.89 ENCOUNTER FOR WEIGHT MANAGEMENT: ICD-10-CM

## 2025-06-18 PROCEDURE — 99213 OFFICE O/P EST LOW 20 MIN: CPT | Performed by: STUDENT IN AN ORGANIZED HEALTH CARE EDUCATION/TRAINING PROGRAM

## 2025-06-23 ENCOUNTER — PATIENT OUTREACH (OUTPATIENT)
Age: 35
End: 2025-06-23

## 2025-06-23 DIAGNOSIS — E66.09 CLASS 2 OBESITY DUE TO EXCESS CALORIES WITHOUT SERIOUS COMORBIDITY WITH BODY MASS INDEX (BMI) OF 35.0 TO 35.9 IN ADULT: Primary | ICD-10-CM

## 2025-06-23 DIAGNOSIS — E66.812 CLASS 2 OBESITY DUE TO EXCESS CALORIES WITHOUT SERIOUS COMORBIDITY WITH BODY MASS INDEX (BMI) OF 35.0 TO 35.9 IN ADULT: Primary | ICD-10-CM

## 2025-06-23 RX ORDER — TIRZEPATIDE 15 MG/.5ML
15 INJECTION, SOLUTION SUBCUTANEOUS WEEKLY
Qty: 6 ML | Refills: 0 | Status: SHIPPED | OUTPATIENT
Start: 2025-06-23

## 2025-06-23 NOTE — PROGRESS NOTES
Requested 90day script from provider for 15mg zepbound. Updated patient via mychart. No further outreach needed, case closed.

## 2025-07-23 ENCOUNTER — APPOINTMENT (OUTPATIENT)
Age: 35
End: 2025-07-23
Payer: COMMERCIAL

## 2025-07-23 ENCOUNTER — OFFICE VISIT (OUTPATIENT)
Age: 35
End: 2025-07-23
Payer: COMMERCIAL

## 2025-07-23 VITALS
HEART RATE: 64 BPM | DIASTOLIC BLOOD PRESSURE: 78 MMHG | SYSTOLIC BLOOD PRESSURE: 120 MMHG | WEIGHT: 253.8 LBS | OXYGEN SATURATION: 98 % | RESPIRATION RATE: 18 BRPM | HEIGHT: 71 IN | BODY MASS INDEX: 35.53 KG/M2

## 2025-07-23 DIAGNOSIS — R22.42 LOCALIZED SWELLING OF TOE OF LEFT FOOT: ICD-10-CM

## 2025-07-23 DIAGNOSIS — R22.42 LOCALIZED SWELLING OF TOE OF LEFT FOOT: Primary | ICD-10-CM

## 2025-07-23 PROCEDURE — 73660 X-RAY EXAM OF TOE(S): CPT

## 2025-07-23 PROCEDURE — 99213 OFFICE O/P EST LOW 20 MIN: CPT

## 2025-07-23 RX ORDER — COLCHICINE 0.6 MG/1
TABLET ORAL
Qty: 9 TABLET | Refills: 0 | Status: SHIPPED | OUTPATIENT
Start: 2025-07-23

## 2025-07-23 NOTE — PROGRESS NOTES
Name: Ruben Espinoza      : 1990      MRN: [id number]  Encounter Provider: Rosie Campbell PA-C  Encounter Date: 2025   Encounter department: [facility]  :  Assessment & Plan  Localized swelling of toe of left foot  Xray ordered to r/o fracture or any other bony abnormality   Uric acid ordered to assess for elevation to assess for gout  Start colchicine to help with pain   Increase water intake, fruits and vegetables, low fat milk and whole grain  Elevate affected joint and limit stress on joint by avoiding intense exercise or physical activity     Orders:    XR toe left third min 2 views; Future    Uric acid; Future    colchicine (COLCRYS) 0.6 mg tablet; Take 2 tabs simultaneously by mouth day 1, then one hour later take 1 tab by mouth, then take 1 tab twice a day for the next 3 days           History of Present Illness   Pt has been experiencing pain on left foot on the big toe. Pain has ongoing for 4 days. Patient has tried ibuprofen and naproxen with no relief. Ice is the only thiung that helps pain. Pain has been worsening over the past 4 days. Woke up 4 days ago with pain in left big toe. Yesterday the pain became increasingly unbearable and he had to limp to walk around work. Denies trauma to left foot or toe or any preceiptaing events that occurred 4 days ago.   Does not eat much red meat, alcohol, or seafood.   Never had this much pain before in any of his toes.          Review of Systems   Constitutional:  Negative for chills and fever.   Respiratory:  Negative for cough and shortness of breath.    Cardiovascular:  Negative for chest pain and palpitations.   Musculoskeletal:  Positive for gait problem (slight limp due to left toe pain) and joint swelling (left toe swelling). Negative for arthralgias and back pain.   Skin:  Negative for color change and rash.   All other systems reviewed and are negative.      Objective   /78 (BP Location: Left arm, Patient Position:  "Sitting, Cuff Size: Standard)   Pulse 64   Resp 18   Ht 5' 11\" (1.803 m)   Wt 115 kg (253 lb 12.8 oz)   SpO2 98%   BMI 35.40 kg/m²      Physical Exam  Vitals and nursing note reviewed.   Constitutional:       General: He is not in acute distress.     Appearance: He is well-developed. He is not ill-appearing, toxic-appearing or diaphoretic.   HENT:      Head: Normocephalic and atraumatic.     Eyes:      Conjunctiva/sclera: Conjunctivae normal.       Cardiovascular:      Rate and Rhythm: Normal rate and regular rhythm.      Heart sounds: No murmur heard.  Pulmonary:      Effort: Pulmonary effort is normal. No respiratory distress.      Breath sounds: Normal breath sounds.     Musculoskeletal:         General: Swelling (left foot first digit-swelling, tender to touch on medial surface of left foot first digit) and tenderness present.     Skin:     General: Skin is warm and dry.      Capillary Refill: Capillary refill takes less than 2 seconds.      Findings: Erythema (slight erythema around base of left foot first digit) present. No rash.     Neurological:      Mental Status: He is alert.     Psychiatric:         Mood and Affect: Mood normal.         "